# Patient Record
Sex: FEMALE | Race: WHITE | NOT HISPANIC OR LATINO | Employment: UNEMPLOYED | URBAN - METROPOLITAN AREA
[De-identification: names, ages, dates, MRNs, and addresses within clinical notes are randomized per-mention and may not be internally consistent; named-entity substitution may affect disease eponyms.]

---

## 2018-02-06 PROBLEM — F90.9 ATTENTION DEFICIT HYPERACTIVITY DISORDER (ADHD): Status: ACTIVE | Noted: 2018-02-06

## 2018-02-06 PROBLEM — F41.1 GAD (GENERALIZED ANXIETY DISORDER): Status: ACTIVE | Noted: 2018-02-06

## 2018-02-06 PROBLEM — Z30.9 ENCOUNTER FOR CONTRACEPTIVE MANAGEMENT: Status: ACTIVE | Noted: 2018-02-06

## 2018-03-07 PROBLEM — J04.0 LARYNGITIS: Status: ACTIVE | Noted: 2018-03-07

## 2018-03-07 PROBLEM — K21.9 GASTROESOPHAGEAL REFLUX DISEASE: Status: ACTIVE | Noted: 2018-03-07

## 2023-08-13 ENCOUNTER — HOSPITAL ENCOUNTER (OUTPATIENT)
Facility: HOSPITAL | Age: 33
Discharge: HOME | End: 2023-08-16
Attending: EMERGENCY MEDICINE | Admitting: INTERNAL MEDICINE
Payer: MEDICAID

## 2023-08-13 DIAGNOSIS — R07.9 CHEST PAIN: ICD-10-CM

## 2023-08-13 DIAGNOSIS — K80.00 CALCULUS OF GALLBLADDER WITH ACUTE CHOLECYSTITIS WITHOUT OBSTRUCTION: ICD-10-CM

## 2023-08-13 DIAGNOSIS — Z90.49 S/P LAPAROSCOPIC CHOLECYSTECTOMY: ICD-10-CM

## 2023-08-13 DIAGNOSIS — R10.11 RUQ ABDOMINAL PAIN: Primary | ICD-10-CM

## 2023-08-13 DIAGNOSIS — K81.0 ACUTE CHOLECYSTITIS: ICD-10-CM

## 2023-08-13 LAB
ALBUMIN SERPL BCP-MCNC: 4.2 G/DL (ref 3.5–5.2)
ALP SERPL-CCNC: 47 U/L (ref 55–135)
ALT SERPL W/O P-5'-P-CCNC: 20 U/L (ref 10–44)
ANION GAP SERPL CALC-SCNC: 8 MMOL/L (ref 8–16)
AST SERPL-CCNC: 16 U/L (ref 10–40)
B-HCG UR QL: NEGATIVE
BACTERIA #/AREA URNS HPF: ABNORMAL /HPF
BASOPHILS # BLD AUTO: 0.03 K/UL (ref 0–0.2)
BASOPHILS NFR BLD: 0.3 % (ref 0–1.9)
BILIRUB SERPL-MCNC: 0.6 MG/DL (ref 0.1–1)
BILIRUB UR QL STRIP: NEGATIVE
BUN SERPL-MCNC: 13 MG/DL (ref 6–20)
CALCIUM SERPL-MCNC: 8.9 MG/DL (ref 8.7–10.5)
CHLORIDE SERPL-SCNC: 107 MMOL/L (ref 95–110)
CLARITY UR: ABNORMAL
CO2 SERPL-SCNC: 25 MMOL/L (ref 23–29)
COLOR UR: YELLOW
CREAT SERPL-MCNC: 0.8 MG/DL (ref 0.5–1.4)
CTP QC/QA: YES
DIFFERENTIAL METHOD: NORMAL
EOSINOPHIL # BLD AUTO: 0.2 K/UL (ref 0–0.5)
EOSINOPHIL NFR BLD: 2.6 % (ref 0–8)
ERYTHROCYTE [DISTWIDTH] IN BLOOD BY AUTOMATED COUNT: 12.5 % (ref 11.5–14.5)
EST. GFR  (NO RACE VARIABLE): >60 ML/MIN/1.73 M^2
GLUCOSE SERPL-MCNC: 111 MG/DL (ref 70–110)
GLUCOSE UR QL STRIP: NEGATIVE
HCT VFR BLD AUTO: 38.9 % (ref 37–48.5)
HGB BLD-MCNC: 13.4 G/DL (ref 12–16)
HGB UR QL STRIP: ABNORMAL
HYALINE CASTS #/AREA URNS LPF: 31 /LPF
IMM GRANULOCYTES # BLD AUTO: 0.02 K/UL (ref 0–0.04)
IMM GRANULOCYTES NFR BLD AUTO: 0.2 % (ref 0–0.5)
KETONES UR QL STRIP: NEGATIVE
LEUKOCYTE ESTERASE UR QL STRIP: ABNORMAL
LIPASE SERPL-CCNC: 38 U/L (ref 4–60)
LYMPHOCYTES # BLD AUTO: 2.2 K/UL (ref 1–4.8)
LYMPHOCYTES NFR BLD: 23.8 % (ref 18–48)
MCH RBC QN AUTO: 29.8 PG (ref 27–31)
MCHC RBC AUTO-ENTMCNC: 34.4 G/DL (ref 32–36)
MCV RBC AUTO: 87 FL (ref 82–98)
MICROSCOPIC COMMENT: ABNORMAL
MONOCYTES # BLD AUTO: 0.6 K/UL (ref 0.3–1)
MONOCYTES NFR BLD: 6.4 % (ref 4–15)
NEUTROPHILS # BLD AUTO: 6.1 K/UL (ref 1.8–7.7)
NEUTROPHILS NFR BLD: 66.7 % (ref 38–73)
NITRITE UR QL STRIP: NEGATIVE
NRBC BLD-RTO: 0 /100 WBC
PH UR STRIP: 7 [PH] (ref 5–8)
PLATELET # BLD AUTO: 240 K/UL (ref 150–450)
PMV BLD AUTO: 11.4 FL (ref 9.2–12.9)
POTASSIUM SERPL-SCNC: 3.7 MMOL/L (ref 3.5–5.1)
PROT SERPL-MCNC: 7.4 G/DL (ref 6–8.4)
PROT UR QL STRIP: ABNORMAL
RBC # BLD AUTO: 4.49 M/UL (ref 4–5.4)
RBC #/AREA URNS HPF: 29 /HPF (ref 0–4)
SODIUM SERPL-SCNC: 140 MMOL/L (ref 136–145)
SP GR UR STRIP: 1.03 (ref 1–1.03)
SQUAMOUS #/AREA URNS HPF: 13 /HPF
URN SPEC COLLECT METH UR: ABNORMAL
UROBILINOGEN UR STRIP-ACNC: ABNORMAL EU/DL
WBC # BLD AUTO: 9.2 K/UL (ref 3.9–12.7)
WBC #/AREA URNS HPF: 36 /HPF (ref 0–5)

## 2023-08-13 PROCEDURE — G0378 HOSPITAL OBSERVATION PER HR: HCPCS

## 2023-08-13 PROCEDURE — 63600175 PHARM REV CODE 636 W HCPCS: Performed by: EMERGENCY MEDICINE

## 2023-08-13 PROCEDURE — 25000003 PHARM REV CODE 250: Performed by: EMERGENCY MEDICINE

## 2023-08-13 PROCEDURE — 80053 COMPREHEN METABOLIC PANEL: CPT | Performed by: EMERGENCY MEDICINE

## 2023-08-13 PROCEDURE — 96365 THER/PROPH/DIAG IV INF INIT: CPT

## 2023-08-13 PROCEDURE — 96375 TX/PRO/DX INJ NEW DRUG ADDON: CPT

## 2023-08-13 PROCEDURE — 96366 THER/PROPH/DIAG IV INF ADDON: CPT

## 2023-08-13 PROCEDURE — 81001 URINALYSIS AUTO W/SCOPE: CPT | Performed by: EMERGENCY MEDICINE

## 2023-08-13 PROCEDURE — 87086 URINE CULTURE/COLONY COUNT: CPT | Performed by: EMERGENCY MEDICINE

## 2023-08-13 PROCEDURE — 81025 URINE PREGNANCY TEST: CPT | Performed by: EMERGENCY MEDICINE

## 2023-08-13 PROCEDURE — 85025 COMPLETE CBC W/AUTO DIFF WBC: CPT | Performed by: EMERGENCY MEDICINE

## 2023-08-13 PROCEDURE — 96361 HYDRATE IV INFUSION ADD-ON: CPT

## 2023-08-13 PROCEDURE — 83690 ASSAY OF LIPASE: CPT | Performed by: EMERGENCY MEDICINE

## 2023-08-13 PROCEDURE — 99285 EMERGENCY DEPT VISIT HI MDM: CPT | Mod: 25

## 2023-08-13 RX ORDER — IBUPROFEN 200 MG
24 TABLET ORAL
Status: DISCONTINUED | OUTPATIENT
Start: 2023-08-13 | End: 2023-08-16 | Stop reason: HOSPADM

## 2023-08-13 RX ORDER — IBUPROFEN 200 MG
16 TABLET ORAL
Status: DISCONTINUED | OUTPATIENT
Start: 2023-08-13 | End: 2023-08-16 | Stop reason: HOSPADM

## 2023-08-13 RX ORDER — NALOXONE HCL 0.4 MG/ML
0.02 VIAL (ML) INJECTION
Status: DISCONTINUED | OUTPATIENT
Start: 2023-08-13 | End: 2023-08-16 | Stop reason: HOSPADM

## 2023-08-13 RX ORDER — GLUCAGON 1 MG
1 KIT INJECTION
Status: DISCONTINUED | OUTPATIENT
Start: 2023-08-13 | End: 2023-08-16 | Stop reason: HOSPADM

## 2023-08-13 RX ORDER — MORPHINE SULFATE 2 MG/ML
2 INJECTION, SOLUTION INTRAMUSCULAR; INTRAVENOUS EVERY 6 HOURS PRN
Status: DISCONTINUED | OUTPATIENT
Start: 2023-08-13 | End: 2023-08-16 | Stop reason: HOSPADM

## 2023-08-13 RX ORDER — SODIUM CHLORIDE 0.9 % (FLUSH) 0.9 %
10 SYRINGE (ML) INJECTION EVERY 12 HOURS PRN
Status: DISCONTINUED | OUTPATIENT
Start: 2023-08-13 | End: 2023-08-16 | Stop reason: HOSPADM

## 2023-08-13 RX ORDER — MORPHINE SULFATE 4 MG/ML
4 INJECTION, SOLUTION INTRAMUSCULAR; INTRAVENOUS
Status: COMPLETED | OUTPATIENT
Start: 2023-08-13 | End: 2023-08-13

## 2023-08-13 RX ORDER — ONDANSETRON 2 MG/ML
4 INJECTION INTRAMUSCULAR; INTRAVENOUS
Status: COMPLETED | OUTPATIENT
Start: 2023-08-13 | End: 2023-08-13

## 2023-08-13 RX ADMIN — SODIUM CHLORIDE 1000 ML: 0.9 INJECTION, SOLUTION INTRAVENOUS at 08:08

## 2023-08-13 RX ADMIN — MORPHINE SULFATE 4 MG: 4 INJECTION, SOLUTION INTRAMUSCULAR; INTRAVENOUS at 08:08

## 2023-08-13 RX ADMIN — ONDANSETRON 4 MG: 2 INJECTION INTRAMUSCULAR; INTRAVENOUS at 08:08

## 2023-08-13 RX ADMIN — PIPERACILLIN AND TAZOBACTAM 3.38 G: 3; .375 INJECTION, POWDER, LYOPHILIZED, FOR SOLUTION INTRAVENOUS; PARENTERAL at 10:08

## 2023-08-14 ENCOUNTER — ANESTHESIA EVENT (OUTPATIENT)
Dept: SURGERY | Facility: HOSPITAL | Age: 33
End: 2023-08-14
Payer: MEDICAID

## 2023-08-14 ENCOUNTER — ANESTHESIA (OUTPATIENT)
Dept: SURGERY | Facility: HOSPITAL | Age: 33
End: 2023-08-14
Payer: MEDICAID

## 2023-08-14 PROBLEM — Z90.49 S/P LAPAROSCOPIC CHOLECYSTECTOMY: Status: ACTIVE | Noted: 2023-08-14

## 2023-08-14 PROBLEM — R10.11 RUQ ABDOMINAL PAIN: Status: ACTIVE | Noted: 2023-08-14

## 2023-08-14 PROBLEM — R82.90 ABNORMAL FINDING ON URINALYSIS: Status: ACTIVE | Noted: 2023-08-14

## 2023-08-14 PROBLEM — K81.0 ACUTE CHOLECYSTITIS: Status: ACTIVE | Noted: 2023-08-13

## 2023-08-14 LAB
ERYTHROCYTE [DISTWIDTH] IN BLOOD BY AUTOMATED COUNT: 12.4 % (ref 11.5–14.5)
HCT VFR BLD AUTO: 36.2 % (ref 37–48.5)
HGB BLD-MCNC: 12.5 G/DL (ref 12–16)
MCH RBC QN AUTO: 30 PG (ref 27–31)
MCHC RBC AUTO-ENTMCNC: 34.5 G/DL (ref 32–36)
MCV RBC AUTO: 87 FL (ref 82–98)
PLATELET # BLD AUTO: 230 K/UL (ref 150–450)
PMV BLD AUTO: 12 FL (ref 9.2–12.9)
RBC # BLD AUTO: 4.16 M/UL (ref 4–5.4)
WBC # BLD AUTO: 8.34 K/UL (ref 3.9–12.7)

## 2023-08-14 PROCEDURE — 36415 COLL VENOUS BLD VENIPUNCTURE: CPT | Performed by: INTERNAL MEDICINE

## 2023-08-14 PROCEDURE — 63600175 PHARM REV CODE 636 W HCPCS: Performed by: NURSE ANESTHETIST, CERTIFIED REGISTERED

## 2023-08-14 PROCEDURE — 25000003 PHARM REV CODE 250: Performed by: NURSE ANESTHETIST, CERTIFIED REGISTERED

## 2023-08-14 PROCEDURE — 71000033 HC RECOVERY, INTIAL HOUR: Performed by: SURGERY

## 2023-08-14 PROCEDURE — C9290 INJ, BUPIVACAINE LIPOSOME: HCPCS | Performed by: SURGERY

## 2023-08-14 PROCEDURE — 47562 PR LAP,CHOLECYSTECTOMY: ICD-10-PCS | Mod: ,,, | Performed by: SURGERY

## 2023-08-14 PROCEDURE — 27201423 OPTIME MED/SURG SUP & DEVICES STERILE SUPPLY: Performed by: SURGERY

## 2023-08-14 PROCEDURE — 47562 LAPAROSCOPIC CHOLECYSTECTOMY: CPT | Mod: ,,, | Performed by: SURGERY

## 2023-08-14 PROCEDURE — 37000009 HC ANESTHESIA EA ADD 15 MINS: Performed by: SURGERY

## 2023-08-14 PROCEDURE — 25000003 PHARM REV CODE 250: Performed by: SURGERY

## 2023-08-14 PROCEDURE — 27000080 OPTIME MED/SURG SUP & DEVICES GENERAL CLASSIFICATION: Performed by: SURGERY

## 2023-08-14 PROCEDURE — 96367 TX/PROPH/DG ADDL SEQ IV INF: CPT | Mod: 59

## 2023-08-14 PROCEDURE — 63600175 PHARM REV CODE 636 W HCPCS: Performed by: INTERNAL MEDICINE

## 2023-08-14 PROCEDURE — 25000003 PHARM REV CODE 250: Performed by: INTERNAL MEDICINE

## 2023-08-14 PROCEDURE — 36000708 HC OR TIME LEV III 1ST 15 MIN: Performed by: SURGERY

## 2023-08-14 PROCEDURE — 63600175 PHARM REV CODE 636 W HCPCS: Performed by: SURGERY

## 2023-08-14 PROCEDURE — D9220A PRA ANESTHESIA: Mod: CRNA,,, | Performed by: NURSE ANESTHETIST, CERTIFIED REGISTERED

## 2023-08-14 PROCEDURE — 25000003 PHARM REV CODE 250: Performed by: ANESTHESIOLOGY

## 2023-08-14 PROCEDURE — 99222 PR INITIAL HOSPITAL CARE,LEVL II: ICD-10-PCS | Mod: 57,,, | Performed by: SURGERY

## 2023-08-14 PROCEDURE — 96366 THER/PROPH/DIAG IV INF ADDON: CPT | Mod: 59

## 2023-08-14 PROCEDURE — 63600175 PHARM REV CODE 636 W HCPCS: Performed by: ANESTHESIOLOGY

## 2023-08-14 PROCEDURE — D9220A PRA ANESTHESIA: ICD-10-PCS | Mod: CRNA,,, | Performed by: NURSE ANESTHETIST, CERTIFIED REGISTERED

## 2023-08-14 PROCEDURE — 37000008 HC ANESTHESIA 1ST 15 MINUTES: Performed by: SURGERY

## 2023-08-14 PROCEDURE — G0378 HOSPITAL OBSERVATION PER HR: HCPCS

## 2023-08-14 PROCEDURE — 36000709 HC OR TIME LEV III EA ADD 15 MIN: Performed by: SURGERY

## 2023-08-14 PROCEDURE — D9220A PRA ANESTHESIA: ICD-10-PCS | Mod: ANES,,, | Performed by: ANESTHESIOLOGY

## 2023-08-14 PROCEDURE — 71000039 HC RECOVERY, EACH ADD'L HOUR: Performed by: SURGERY

## 2023-08-14 PROCEDURE — 96376 TX/PRO/DX INJ SAME DRUG ADON: CPT | Mod: 59

## 2023-08-14 PROCEDURE — 85027 COMPLETE CBC AUTOMATED: CPT | Performed by: INTERNAL MEDICINE

## 2023-08-14 PROCEDURE — 99222 1ST HOSP IP/OBS MODERATE 55: CPT | Mod: 57,,, | Performed by: SURGERY

## 2023-08-14 PROCEDURE — D9220A PRA ANESTHESIA: Mod: ANES,,, | Performed by: ANESTHESIOLOGY

## 2023-08-14 RX ORDER — DIPHENHYDRAMINE HYDROCHLORIDE 50 MG/ML
INJECTION INTRAMUSCULAR; INTRAVENOUS
Status: DISCONTINUED | OUTPATIENT
Start: 2023-08-14 | End: 2023-08-14

## 2023-08-14 RX ORDER — ACETAMINOPHEN 325 MG/1
650 TABLET ORAL EVERY 6 HOURS PRN
Status: DISCONTINUED | OUTPATIENT
Start: 2023-08-14 | End: 2023-08-16 | Stop reason: HOSPADM

## 2023-08-14 RX ORDER — ACETAMINOPHEN 10 MG/ML
INJECTION, SOLUTION INTRAVENOUS
Status: DISCONTINUED | OUTPATIENT
Start: 2023-08-14 | End: 2023-08-14

## 2023-08-14 RX ORDER — FENTANYL CITRATE 50 UG/ML
INJECTION, SOLUTION INTRAMUSCULAR; INTRAVENOUS
Status: DISCONTINUED | OUTPATIENT
Start: 2023-08-14 | End: 2023-08-14

## 2023-08-14 RX ORDER — DEXTROAMPHETAMINE SACCHARATE, AMPHETAMINE ASPARTATE, DEXTROAMPHETAMINE SULFATE AND AMPHETAMINE SULFATE 5; 5; 5; 5 MG/1; MG/1; MG/1; MG/1
2 TABLET ORAL DAILY
COMMUNITY

## 2023-08-14 RX ORDER — DIPHENHYDRAMINE HYDROCHLORIDE 50 MG/ML
12.5 INJECTION INTRAMUSCULAR; INTRAVENOUS
Status: DISCONTINUED | OUTPATIENT
Start: 2023-08-14 | End: 2023-08-14 | Stop reason: HOSPADM

## 2023-08-14 RX ORDER — ROCURONIUM BROMIDE 10 MG/ML
INJECTION, SOLUTION INTRAVENOUS
Status: DISCONTINUED | OUTPATIENT
Start: 2023-08-14 | End: 2023-08-14

## 2023-08-14 RX ORDER — LIDOCAINE HYDROCHLORIDE 20 MG/ML
INJECTION INTRAVENOUS
Status: DISCONTINUED | OUTPATIENT
Start: 2023-08-14 | End: 2023-08-14

## 2023-08-14 RX ORDER — DEXAMETHASONE SODIUM PHOSPHATE 4 MG/ML
INJECTION, SOLUTION INTRA-ARTICULAR; INTRALESIONAL; INTRAMUSCULAR; INTRAVENOUS; SOFT TISSUE
Status: DISCONTINUED | OUTPATIENT
Start: 2023-08-14 | End: 2023-08-14

## 2023-08-14 RX ORDER — OXYCODONE HYDROCHLORIDE 5 MG/1
5 TABLET ORAL
Status: DISCONTINUED | OUTPATIENT
Start: 2023-08-14 | End: 2023-08-14 | Stop reason: HOSPADM

## 2023-08-14 RX ORDER — ONDANSETRON 2 MG/ML
INJECTION INTRAMUSCULAR; INTRAVENOUS
Status: DISCONTINUED | OUTPATIENT
Start: 2023-08-14 | End: 2023-08-14

## 2023-08-14 RX ORDER — MIDAZOLAM HYDROCHLORIDE 5 MG/ML
INJECTION INTRAMUSCULAR; INTRAVENOUS
Status: DISCONTINUED | OUTPATIENT
Start: 2023-08-14 | End: 2023-08-14

## 2023-08-14 RX ORDER — HYDROCODONE BITARTRATE AND ACETAMINOPHEN 5; 325 MG/1; MG/1
1 TABLET ORAL EVERY 6 HOURS PRN
Status: DISCONTINUED | OUTPATIENT
Start: 2023-08-14 | End: 2023-08-16 | Stop reason: HOSPADM

## 2023-08-14 RX ORDER — ONDANSETRON 2 MG/ML
4 INJECTION INTRAMUSCULAR; INTRAVENOUS EVERY 4 HOURS PRN
Status: DISCONTINUED | OUTPATIENT
Start: 2023-08-14 | End: 2023-08-16 | Stop reason: HOSPADM

## 2023-08-14 RX ORDER — HYDROMORPHONE HYDROCHLORIDE 1 MG/ML
0.2 INJECTION, SOLUTION INTRAMUSCULAR; INTRAVENOUS; SUBCUTANEOUS EVERY 5 MIN PRN
Status: DISCONTINUED | OUTPATIENT
Start: 2023-08-14 | End: 2023-08-14 | Stop reason: HOSPADM

## 2023-08-14 RX ORDER — ONDANSETRON 2 MG/ML
4 INJECTION INTRAMUSCULAR; INTRAVENOUS DAILY PRN
Status: DISCONTINUED | OUTPATIENT
Start: 2023-08-14 | End: 2023-08-14 | Stop reason: HOSPADM

## 2023-08-14 RX ORDER — SUCCINYLCHOLINE CHLORIDE 20 MG/ML
INJECTION INTRAMUSCULAR; INTRAVENOUS
Status: DISCONTINUED | OUTPATIENT
Start: 2023-08-14 | End: 2023-08-14

## 2023-08-14 RX ORDER — BUPIVACAINE HYDROCHLORIDE AND EPINEPHRINE 2.5; 5 MG/ML; UG/ML
INJECTION, SOLUTION EPIDURAL; INFILTRATION; INTRACAUDAL; PERINEURAL
Status: DISCONTINUED | OUTPATIENT
Start: 2023-08-14 | End: 2023-08-14 | Stop reason: HOSPADM

## 2023-08-14 RX ORDER — PROPOFOL 10 MG/ML
VIAL (ML) INTRAVENOUS
Status: DISCONTINUED | OUTPATIENT
Start: 2023-08-14 | End: 2023-08-14

## 2023-08-14 RX ORDER — SODIUM CHLORIDE 9 MG/ML
INJECTION, SOLUTION INTRAVENOUS CONTINUOUS
Status: DISCONTINUED | OUTPATIENT
Start: 2023-08-14 | End: 2023-08-15

## 2023-08-14 RX ADMIN — CEFTRIAXONE SODIUM 1 G: 1 INJECTION, POWDER, FOR SOLUTION INTRAMUSCULAR; INTRAVENOUS at 01:08

## 2023-08-14 RX ADMIN — ROCURONIUM BROMIDE 5 MG: 10 INJECTION, SOLUTION INTRAVENOUS at 12:08

## 2023-08-14 RX ADMIN — ACETAMINOPHEN 1000 MG: 10 INJECTION, SOLUTION INTRAVENOUS at 12:08

## 2023-08-14 RX ADMIN — HYDROMORPHONE HYDROCHLORIDE 0.2 MG: 1 INJECTION, SOLUTION INTRAMUSCULAR; INTRAVENOUS; SUBCUTANEOUS at 02:08

## 2023-08-14 RX ADMIN — MIDAZOLAM HYDROCHLORIDE 2 MG: 5 INJECTION, SOLUTION INTRAMUSCULAR; INTRAVENOUS at 12:08

## 2023-08-14 RX ADMIN — DEXAMETHASONE SODIUM PHOSPHATE 8 MG: 4 INJECTION, SOLUTION INTRAMUSCULAR; INTRAVENOUS at 12:08

## 2023-08-14 RX ADMIN — SODIUM CHLORIDE: 0.9 INJECTION, SOLUTION INTRAVENOUS at 08:08

## 2023-08-14 RX ADMIN — SODIUM CHLORIDE, SODIUM LACTATE, POTASSIUM CHLORIDE, AND CALCIUM CHLORIDE: .6; .31; .03; .02 INJECTION, SOLUTION INTRAVENOUS at 12:08

## 2023-08-14 RX ADMIN — PROPOFOL 180 MG: 10 INJECTION, EMULSION INTRAVENOUS at 12:08

## 2023-08-14 RX ADMIN — ONDANSETRON 4 MG: 2 INJECTION INTRAMUSCULAR; INTRAVENOUS at 12:08

## 2023-08-14 RX ADMIN — MORPHINE SULFATE 2 MG: 2 INJECTION, SOLUTION INTRAMUSCULAR; INTRAVENOUS at 08:08

## 2023-08-14 RX ADMIN — FENTANYL CITRATE 100 MCG: 50 INJECTION, SOLUTION INTRAMUSCULAR; INTRAVENOUS at 12:08

## 2023-08-14 RX ADMIN — ONDANSETRON 4 MG: 2 INJECTION INTRAMUSCULAR; INTRAVENOUS at 08:08

## 2023-08-14 RX ADMIN — LIDOCAINE HYDROCHLORIDE 60 MG: 20 INJECTION, SOLUTION INTRAVENOUS at 12:08

## 2023-08-14 RX ADMIN — HYDROMORPHONE HYDROCHLORIDE 0.2 MG: 1 INJECTION, SOLUTION INTRAMUSCULAR; INTRAVENOUS; SUBCUTANEOUS at 01:08

## 2023-08-14 RX ADMIN — Medication 160 MG: at 12:08

## 2023-08-14 RX ADMIN — OXYCODONE HYDROCHLORIDE 5 MG: 5 TABLET ORAL at 01:08

## 2023-08-14 RX ADMIN — DIPHENHYDRAMINE HYDROCHLORIDE 6.25 MG: 50 INJECTION INTRAMUSCULAR; INTRAVENOUS at 12:08

## 2023-08-14 RX ADMIN — SUGAMMADEX 200 MG: 100 INJECTION, SOLUTION INTRAVENOUS at 01:08

## 2023-08-14 NOTE — CONSULTS
GENERAL SURGERY  INPATIENT CONSULT    REASON FOR CONSULT: Cholecystitis    HPI: Kristina Blum is a 33 y.o. female admitted for suspicion acute cholecystitis. Patient presented with right upper abdominal pain radiating to the belly button which was worse after eating. Was associated with nausea. No fevers or chills. Had an ultrasound showing stones but no obvious gallbladder wall thickening or pericholecystic fluid.  She did have a positive sonographic Sandoval sign. CBD was in normal limits.  Labs were unremarkable.  Patient was admitted for clinical cholecystitis.  General surgery has been consulted for evaluation. Patient denies yellowing of the skin or eyes.  Pain somewhat better at current.    I have reviewed the patient's chart including prior progress notes, procedures and testing.     ROS:   Review of Systems    PROBLEM LIST:  Patient Active Problem List   Diagnosis    Encounter for contraceptive management    Attention deficit hyperactivity disorder (ADHD)    SOLIS (generalized anxiety disorder)    Gastroesophageal reflux disease    BMI 32.0-32.9,adult    Laryngitis    Cholelithiasis with acute cholecystitis         HISTORY  Past Medical History:   Diagnosis Date    Allergy     Arthritis     Asthma        Past Surgical History:   Procedure Laterality Date    FRACTURE SURGERY         Social History     Tobacco Use    Smoking status: Never    Smokeless tobacco: Never   Substance Use Topics    Alcohol use: No    Drug use: No       Family History   Problem Relation Age of Onset    Hypertension Mother     Diabetes Mother     No Known Problems Father     No Known Problems Sister     Cancer Maternal Grandmother     Heart disease Maternal Grandfather          MEDS:  No current facility-administered medications on file prior to encounter.     Current Outpatient Medications on File Prior to Encounter   Medication Sig Dispense Refill    dextroamphetamine-amphetamine (ADDERALL) 20 mg tablet Take 2 tablets by mouth once  daily.      drospirenone-ethinyl estradiol (KATE) 3-0.02 mg per tablet TAKE 1 TABLET BY MOUTH EVERY DAY (Patient taking differently: Take 1 tablet by mouth once daily.) 28 tablet 0    [DISCONTINUED] PRENATAL VITS-IRON FUM-FOLIC ORAL Take by mouth.         ALLERGIES:  Review of patient's allergies indicates:  No Known Allergies      VITALS:  Temp:  [97.9 °F (36.6 °C)-98.2 °F (36.8 °C)] 97.9 °F (36.6 °C)  Pulse:  [69-82] 77  Resp:  [16-18] 16  SpO2:  [97 %-100 %] 98 %  BP: (133-169)/(80-99) 149/83    I/O last 3 completed shifts:  In: 1000 [IV Piggyback:1000]  Out: -       PHYSICAL EXAM:  Physical Exam  Vitals reviewed.   Constitutional:       General: She is not in acute distress.     Appearance: Normal appearance. She is well-developed.   HENT:      Head: Normocephalic and atraumatic.      Nose: Nose normal.   Eyes:      General: No scleral icterus.     Conjunctiva/sclera: Conjunctivae normal.   Neck:      Trachea: No tracheal tenderness or tracheal deviation.   Cardiovascular:      Rate and Rhythm: Normal rate and regular rhythm.      Pulses: Normal pulses.   Pulmonary:      Effort: Pulmonary effort is normal. No accessory muscle usage or respiratory distress.      Breath sounds: Normal breath sounds.   Abdominal:      General: There is no distension.      Palpations: Abdomen is soft.      Tenderness: There is abdominal tenderness (Mild right upper quadrant). There is no guarding or rebound.   Musculoskeletal:         General: No swelling or tenderness. Normal range of motion.      Cervical back: Normal range of motion and neck supple. No rigidity.   Skin:     General: Skin is warm and dry.      Coloration: Skin is not jaundiced.      Findings: No erythema.   Neurological:      General: No focal deficit present.      Mental Status: She is alert and oriented to person, place, and time.      Motor: No weakness or abnormal muscle tone.   Psychiatric:         Mood and Affect: Mood normal.         Behavior: Behavior  "normal.         Thought Content: Thought content normal.         Judgment: Judgment normal.           LABS:  Lab Results   Component Value Date    WBC 8.34 08/14/2023    RBC 4.16 08/14/2023    HGB 12.5 08/14/2023    HCT 36.2 (L) 08/14/2023     08/14/2023     Lab Results   Component Value Date     (H) 08/13/2023     08/13/2023    K 3.7 08/13/2023     08/13/2023    CO2 25 08/13/2023    BUN 13 08/13/2023    CREATININE 0.8 08/13/2023    CALCIUM 8.9 08/13/2023     Lab Results   Component Value Date    ALT 20 08/13/2023    AST 16 08/13/2023    ALKPHOS 47 (L) 08/13/2023    BILITOT 0.6 08/13/2023     No results found for: "MG", "PHOS"    STUDIES:  Images and reports were personally reviewed.    Abdominal ultrasound  FINDINGS: Routine ultrasound evaluation of the right upper quadrant is performed. The IVC appears patent. The liver is mildly enlarged measuring up to 19 cm cephalocaudal with at least mild diffuse hepatic steatosis present. No solid or cystic hepatic lesions or peripheral nodularity is evident. The portal vein flow is hepatopedal.  The gallbladder contains multiple shadowing gallstones. No pathologic gallbladder wall thickening or pericholecystic fluid is seen. The common bile duct is normal caliber measuring 5 mm. The sonographic Sandoval's sign is positive. No ascites is evident. The pancreas is not included on the study. The right kidney measures 9.6 x 4.3 cm  and appears within normal limits.     IMPRESSION:  1. Cholelithiasis with a positive sonographic Sandoval sign. Surgical consultation is recommended to rule out acute cholecystitis.  2. Hepatomegaly and mild diffuse hepatic steatosis are incidentally noted.      ASSESSMENT & PLAN:  33 y.o. female with acute calculous cholecystitis  -history, imaging and exam consistent with acute calculous cholecystitis  -discussed with the patient options including conservative management versus surgical intervention, recommending surgical " intervention due to severity of symptoms which led her to the emergency room  - risks and benefits of cholecystectomy were reviewed.  Specifically we discussed the risk of pain, bleeding, scarring, infection, bile leak, injury to common bile duct, injury to surrounding organs, retained stone, pancreatitis, bile leak, failure to relieve symptoms, etc.  we also discussed the need for potential conversion to open procedure or need to perform intraoperative cholangiogram.  The patient expressed understanding of these risks and agreed proceed with surgical intervention.  -to OR today for laparoscopic cholecystectomy  -diet to be advanced as tolerated, could consider discharge home later this evening or tomorrow morning once tolerating diet and pain adequately controlled

## 2023-08-14 NOTE — TRANSFER OF CARE
"Anesthesia Transfer of Care Note    Patient: Kristina Blum    Procedure(s) Performed: Procedure(s) (LRB):  CHOLECYSTECTOMY, LAPAROSCOPIC (N/A)    Patient location: GI    Anesthesia Type: general    Transport from OR: Transported from OR on room air with adequate spontaneous ventilation    Post pain: adequate analgesia    Post assessment: no apparent anesthetic complications    Post vital signs: stable    Level of consciousness: awake and alert    Nausea/Vomiting: no nausea/vomiting    Complications: none    Transfer of care protocol was followed      Last vitals:   Visit Vitals  BP (!) 149/83 (BP Location: Left arm, Patient Position: Lying)   Pulse 77   Temp 36.6 °C (97.9 °F) (Oral)   Resp 16   Ht 5' 7" (1.702 m)   Wt 93.6 kg (206 lb 4.8 oz)   LMP 07/13/2023 (Approximate)   SpO2 98%   Breastfeeding No   BMI 32.31 kg/m²     "

## 2023-08-14 NOTE — PLAN OF CARE
[facility]  Initial Discharge Assessment       Primary Care Provider: Jeansonne, Richard M. Jr., MD    Admission Diagnosis: RUQ abdominal pain [R10.11]    Admission Date: 8/13/2023  Expected Discharge Date: 8/15/2023    Transition of Care Barriers: None    Initial assessment completed at bedside with pt. Pt anticipates discharge home with no needs when medically clear.    Payor: MEDICAID / Plan: [place] / Product Type: Managed Medicaid /     Extended Emergency Contact Information  Primary Emergency Contact: Kal Leroy  Mobile Phone: [phone]  Relation: Other  Preferred language: English   needed? No    Discharge Plan A: Home  Discharge Plan B: Home      [id number] - [place] - [address]  [address]  [place] [address]  Phone: [phone] Fax: [phone]      Initial Assessment (most recent)       Adult Discharge Assessment - 08/14/23 1611          Discharge Assessment    Assessment Type Discharge Planning Assessment     Confirmed/corrected address, phone number and insurance Yes     Confirmed Demographics Correct on Facesheet     Source of Information patient     When was your last doctors appointment? 05/08/23     Reason For Admission RUQ pain     People in Home alone     Facility Arrived From: home     Do you expect to return to your current living situation? Yes     Do you have help at home or someone to help you manage your care at home? Yes     Who are your caregiver(s) and their phone number(s)? Kal (other) [phone]     Prior to hospitilization cognitive status: Alert/Oriented     Current cognitive status: Alert/Oriented     Walking or Climbing Stairs --   none    Dressing/Bathing --   none    Home Layout Able to live on 1st floor     Equipment Currently Used at Home none     Readmission within 30 days? No     Patient currently being followed by outpatient case management?  No     Do you currently have service(s) that help you manage your care at home? No     Do you take prescription medications? Yes     Do you have prescription coverage? Yes     Coverage [employer]     Do you have any problems affording any of your prescribed medications? No     Is the patient taking medications as prescribed? yes     Who is going to help you get home at discharge? Kal (other) [phone]     How do you get to doctors appointments? car, drives self     Are you on dialysis? No     Do you take coumadin? No     Discharge Plan A Home     Discharge Plan B Home     DME Needed Upon Discharge  none     Discharge Plan discussed with: Patient     Transition of Care Barriers None        Physical Activity    On average, how many days per week do you engage in moderate to strenuous exercise (like a brisk walk)? 0 days     On average, how many minutes do you engage in exercise at this level? 0 min        Financial Resource Strain    How hard is it for you to pay for the very basics like food, housing, medical care, and heating? Not hard at all        Housing Stability    In the last 12 months, was there a time when you were not able to pay the mortgage or rent on time? No     In the last 12 months, how many places have you lived? 1     In the last 12 months, was there a time when you did not have a steady place to sleep or slept in a shelter (including now)? No        Transportation Needs    In the past 12 months, has lack of transportation kept you from medical appointments or from getting medications? No     In the past 12 months, has lack of transportation kept you from meetings, work, or from getting things needed for daily living? No        Food Insecurity    Within the past 12 months, you worried that your food would run out before you got the money to buy more. Never true     Within the past 12 months, the food you bought just didn't last and you didn't have money to get more. Never true         Stress    Do you feel stress - tense, restless, nervous, or anxious, or unable to sleep at night because your mind is troubled all the time - these days? Not at all        Social Connections    In a typical week, how many times do you talk on the phone with family, friends, or neighbors? Never     How often do you get together with friends or relatives? Never     How often do you attend Oriental orthodox or Sikh services? Never     Do you belong to any clubs or organizations such as Oriental orthodox groups, unions, fraternal or athletic groups, or school groups? No     How often do you attend meetings of the clubs or organizations you belong to? Never        Alcohol Use    Q1: How often do you have a drink containing alcohol? Monthly or less     Q2: How many drinks containing alcohol do you have on a typical day when you are drinking? 1 or 2     Q3: How often do you have six or more drinks on one occasion? Never

## 2023-08-14 NOTE — HOSPITAL COURSE
Patient presented with 4 hour history of abdominal pain.  Denies any urinary symptoms.  Afebrile, no leukocytosis, UA positive subsequent culture with multiple organisms and likely contaminant.  Ultrasound and CT with concern for acute calculous cholecystitis.  She was started on empiric antibiotics.  On 08/14 general surgery performed laparoscopic cholecystectomy.  She was monitored postoperatively, diet slowly advanced, serial abdominal examination, increase activity.  T bilirubin did increase to 1.2 but subsequently downtrended to 0.4.  On 08/16 tolerating oral diet, abdominal pain better control, passing flatus, she has ambulated around the unit.  Cleared by consultant for discharge and appears medically stable for discharge.  Laparoscopic cholecystectomy precautions/instructions reviewed with patient, educational material provided.  Discharge plan including medication, follow-up as well as return precautions were reviewed with the patient, she expressed understanding, no questions or concerns.  Discussed with nursing.      Discharge examination  Lying in bed, alert, oriented, on room air, abdomen soft, laparoscopic incision well approximated, bowel sounds heard

## 2023-08-14 NOTE — SUBJECTIVE & OBJECTIVE
Interval History:  See hospital course.  Seen in PACU after laparoscopic cholecystectomy.  Does report abdominal soreness.  States abdominal pain started about 4 hours prior to ED presentation.  Denies any urinary symptoms.  Afebrile, no leukocytosis, UA positive but cultures no growth to date.  Imaging reviewed.  Discussed with patient.    Review of Systems   Constitutional:  Negative for fever.   Respiratory:  Negative for shortness of breath.    Gastrointestinal:  Positive for abdominal pain.   Genitourinary:  Negative for dysuria.     Objective:     Vital Signs (Most Recent):  Temp: 97.9 °F (36.6 °C) (08/14/23 1415)  Pulse: 80 (08/14/23 1430)  Resp: 14 (08/14/23 1442)  BP: (!) 142/73 (08/14/23 1430)  SpO2: (!) 94 % (08/14/23 1430) Vital Signs (24h Range):  Temp:  [97.9 °F (36.6 °C)-98.2 °F (36.8 °C)] 97.9 °F (36.6 °C)  Pulse:  [69-88] 80  Resp:  [14-22] 14  SpO2:  [94 %-100 %] 94 %  BP: (133-169)/(73-99) 142/73     Weight: 93.6 kg (206 lb 4.8 oz)  Body mass index is 32.31 kg/m².    Intake/Output Summary (Last 24 hours) at 8/14/2023 1455  Last data filed at 8/14/2023 1305  Gross per 24 hour   Intake 2000 ml   Output --   Net 2000 ml         Physical Exam  Vitals and nursing note reviewed.   Constitutional:       General: She is not in acute distress.     Appearance: She is obese. She is not ill-appearing.   HENT:      Head: Normocephalic and atraumatic.      Mouth/Throat:      Mouth: Mucous membranes are moist.   Cardiovascular:      Rate and Rhythm: Normal rate and regular rhythm.   Pulmonary:      Comments: On NC  Abdominal:      Comments: Laparoscopic incision well approximated, abdominal striae present, appropriately tender to palpate   Skin:     General: Skin is warm.   Neurological:      Mental Status: She is alert and oriented to person, place, and time.   Psychiatric:         Mood and Affect: Mood normal.             Significant Labs: BMP:   Recent Labs   Lab 08/13/23 2032   *      K 3.7  "     CO2 25   BUN 13   CREATININE 0.8   CALCIUM 8.9     CBC:   Recent Labs   Lab 08/13/23 2032 08/14/23  0405   WBC 9.20 8.34   HGB 13.4 12.5   HCT 38.9 36.2*    230     CMP:   Recent Labs   Lab 08/13/23 2032      K 3.7      CO2 25   *   BUN 13   CREATININE 0.8   CALCIUM 8.9   PROT 7.4   ALBUMIN 4.2   BILITOT 0.6   ALKPHOS 47*   AST 16   ALT 20   ANIONGAP 8     Cardiac Markers: No results for input(s): "CKMB", "MYOGLOBIN", "BNP", "TROPISTAT" in the last 48 hours.  Lactic Acid: No results for input(s): "LACTATE" in the last 48 hours.  Magnesium: No results for input(s): "MG" in the last 48 hours.  POCT Glucose: No results for input(s): "POCTGLUCOSE" in the last 48 hours.    Significant Imaging: I have reviewed all pertinent imaging results/findings within the past 24 hours.    CT Abdomen Pelvis  Without Contrast    Result Date: 8/13/2023  PROCEDURE: CT ABDOMEN PELVIS WITHOUT IV CONTRAST, 8/13/2023 9:43 PM CDT CLINICAL INDICATION:  Flank pain, kidney stone suspected. COMPARISON: None TECHNIQUE: Helical CT acquisition performed of the abdomen and pelvis with coronal and sagittal reformats. PO Contrast: None.  Complications: None. CT Dose reduction techniques were utilized for this examination with 1 or more of the following: Automated exposure control and/or adjustment of the mA or kV according to patient size, and/or use of iterative reconstruction technique. FINDINGS: Evaluation of vessels and solid organs limited without IV contrast. Visualized chest base: Probable atelectasis in posterior lungs. Infiltrate or edema could also have a similar appearance. Free air: None. Hepatobiliary system: No contour deforming hepatic lesion. Gallbladder: Irregular appearance of the gallbladder with numerous gallstones and faint strandy changes adjacent portions of the gallbladder, appearance is most suggestive of acute cholecystitis. Spleen: Normal size. Pancreas: No significant peripancreatic " inflammatory changes. Adrenal glands: No focal mass. Kidneys and collecting system: No hydronephrosis. Bowel: No small bowel obstruction. Appendix: Normal caliber. No significant inflammatory changes. Pelvic organs: Unremarkable urinary bladder. Uterus present. Grossly symmetric ovaries. Bones: Prominent disc osteophyte complex at level of L3-4, advanced for patient age. This severely narrows the central spinal canal at this level. IMPRESSION: Irregular appearance of the gallbladder with numerous gallstones and faint strandy changes adjacent portions of the gallbladder, appearance is most suggestive of acute cholecystitis. Surgical consultation is advised. Probable atelectasis in posterior lungs. Infiltrate or edema could also have a similar appearance. Severe narrowing of central spinal canal at L3-4 due to prominent disc osteophyte complex, advanced disc complex and central spinal canal narrowing for patient age. Correlate for associated symptomatology. Dedicated MRI is advised for characterization when clinically appropriate. Results were called to ordering provider at 8:05 PM central time. Electronically signed by:  Patrick Stern MD  8/13/2023 10:05 PM CDT Workstation: [id number]    US Abdomen Limited    Result Date: 8/13/2023  EXAMINATION: US ABDOMEN LIMITED COMPARISON: None available. INDICATION: 33-year-old female with ruq abdominal pain FINDINGS: Routine ultrasound evaluation of the right upper quadrant is performed. The IVC appears patent. The liver is mildly enlarged measuring up to 19 cm cephalocaudal with at least mild diffuse hepatic steatosis present. No solid or cystic hepatic lesions or peripheral nodularity is evident. The portal vein flow is hepatopedal.  The gallbladder contains multiple shadowing gallstones. No pathologic gallbladder wall thickening or pericholecystic fluid is seen. The common bile duct is normal caliber measuring 5 mm. The sonographic Sandoval's sign is positive. No ascites is  evident. The pancreas is not included on the study. The right kidney measures 9.6 x 4.3 cm  and appears within normal limits. IMPRESSION: 1. Cholelithiasis with a positive sonographic Sandoval sign. Surgical consultation is recommended to rule out acute cholecystitis. 2. Hepatomegaly and mild diffuse hepatic steatosis are incidentally noted. Electronically signed by:  Neno Bobo MD  8/13/2023 8:28 PM CDT Workstation: [id number]

## 2023-08-14 NOTE — PROGRESS NOTES
[facility] Medicine  Progress Note    Patient Name: Kristina Blum  MRN: [id number]  Patient Class: OP- Observation   Admission Date: 8/13/2023  Length of Stay: 0 days  Attending Physician: Meghann Dodson MD  Primary Care Provider: Jocelin, Primary Doctor        Subjective:     Principal Problem:S/P laparoscopic cholecystectomy        HPI:  33-year-old female with no significant PMH came with complaining right upper right abdominal pain.   It  Pain began earlier today and constant and worsen with eating food but deny fever , chills or SOB but some nausea   US is positive but   She denies any dysuria or frequency.    US abdomen was done with Cholelithiasis with a positive sonographic Sandoval sign but no typical feature of cholecystitis and CT abdomen pending . CBD normal caliber .  On exam , no guarding and marked tenderness  in RHC most likely clinical cholecystitis . She is on OC  pill, overweight and female.      Overview/Hospital Course:  Patient presented with 4 hour history of abdominal pain.  Denies any urinary symptoms.  Afebrile, no leukocytosis, UA positive.  Ultrasound and CT with concern for acute calculous cholecystitis.  She was started on empiric antibiotics.  On 08/14 general surgery performed laparoscopic cholecystectomy.  Monitoring postoperatively.      Interval History:  See hospital course.  Seen in PACU after laparoscopic cholecystectomy.  Does report abdominal soreness.  States abdominal pain started about 4 hours prior to ED presentation.  Denies any urinary symptoms.  Afebrile, no leukocytosis, UA positive but cultures no growth to date.  Imaging reviewed.  Discussed with patient.    Review of Systems   Constitutional:  Negative for fever.   Respiratory:  Negative for shortness of breath.    Gastrointestinal:  Positive for abdominal pain.   Genitourinary:  Negative for dysuria.     Objective:     Vital Signs (Most Recent):  Temp: 97.9 °F (36.6 °C) (08/14/23 1415)  Pulse:  "80 (08/14/23 1430)  Resp: 14 (08/14/23 1442)  BP: (!) 142/73 (08/14/23 1430)  SpO2: (!) 94 % (08/14/23 1430) Vital Signs (24h Range):  Temp:  [97.9 °F (36.6 °C)-98.2 °F (36.8 °C)] 97.9 °F (36.6 °C)  Pulse:  [69-88] 80  Resp:  [14-22] 14  SpO2:  [94 %-100 %] 94 %  BP: (133-169)/(73-99) 142/73     Weight: 93.6 kg (206 lb 4.8 oz)  Body mass index is 32.31 kg/m².    Intake/Output Summary (Last 24 hours) at 8/14/2023 1455  Last data filed at 8/14/2023 1305  Gross per 24 hour   Intake 2000 ml   Output --   Net 2000 ml         Physical Exam  Vitals and nursing note reviewed.   Constitutional:       General: She is not in acute distress.     Appearance: She is obese. She is not ill-appearing.   HENT:      Head: Normocephalic and atraumatic.      Mouth/Throat:      Mouth: Mucous membranes are moist.   Cardiovascular:      Rate and Rhythm: Normal rate and regular rhythm.   Pulmonary:      Comments: On NC  Abdominal:      Comments: Laparoscopic incision well approximated, abdominal striae present, appropriately tender to palpate   Skin:     General: Skin is warm.   Neurological:      Mental Status: She is alert and oriented to person, place, and time.   Psychiatric:         Mood and Affect: Mood normal.             Significant Labs: BMP:   Recent Labs   Lab 08/13/23 2032   *      K 3.7      CO2 25   BUN 13   CREATININE 0.8   CALCIUM 8.9     CBC:   Recent Labs   Lab 08/13/23 2032 08/14/23  0405   WBC 9.20 8.34   HGB 13.4 12.5   HCT 38.9 36.2*    230     CMP:   Recent Labs   Lab 08/13/23 2032      K 3.7      CO2 25   *   BUN 13   CREATININE 0.8   CALCIUM 8.9   PROT 7.4   ALBUMIN 4.2   BILITOT 0.6   ALKPHOS 47*   AST 16   ALT 20   ANIONGAP 8     Cardiac Markers: No results for input(s): "CKMB", "MYOGLOBIN", "BNP", "TROPISTAT" in the last 48 hours.  Lactic Acid: No results for input(s): "LACTATE" in the last 48 hours.  Magnesium: No results for input(s): "MG" in the last 48 " "hours.  POCT Glucose: No results for input(s): "POCTGLUCOSE" in the last 48 hours.    Significant Imaging: I have reviewed all pertinent imaging results/findings within the past 24 hours.    CT Abdomen Pelvis  Without Contrast    Result Date: 8/13/2023  PROCEDURE: CT ABDOMEN PELVIS WITHOUT IV CONTRAST, 8/13/2023 9:43 PM CDT CLINICAL INDICATION:  Flank pain, kidney stone suspected. COMPARISON: None TECHNIQUE: Helical CT acquisition performed of the abdomen and pelvis with coronal and sagittal reformats. PO Contrast: None.  Complications: None. CT Dose reduction techniques were utilized for this examination with 1 or more of the following: Automated exposure control and/or adjustment of the mA or kV according to patient size, and/or use of iterative reconstruction technique. FINDINGS: Evaluation of vessels and solid organs limited without IV contrast. Visualized chest base: Probable atelectasis in posterior lungs. Infiltrate or edema could also have a similar appearance. Free air: None. Hepatobiliary system: No contour deforming hepatic lesion. Gallbladder: Irregular appearance of the gallbladder with numerous gallstones and faint strandy changes adjacent portions of the gallbladder, appearance is most suggestive of acute cholecystitis. Spleen: Normal size. Pancreas: No significant peripancreatic inflammatory changes. Adrenal glands: No focal mass. Kidneys and collecting system: No hydronephrosis. Bowel: No small bowel obstruction. Appendix: Normal caliber. No significant inflammatory changes. Pelvic organs: Unremarkable urinary bladder. Uterus present. Grossly symmetric ovaries. Bones: Prominent disc osteophyte complex at level of L3-4, advanced for patient age. This severely narrows the central spinal canal at this level. IMPRESSION: Irregular appearance of the gallbladder with numerous gallstones and faint strandy changes adjacent portions of the gallbladder, appearance is most suggestive of acute cholecystitis. " Surgical consultation is advised. Probable atelectasis in posterior lungs. Infiltrate or edema could also have a similar appearance. Severe narrowing of central spinal canal at L3-4 due to prominent disc osteophyte complex, advanced disc complex and central spinal canal narrowing for patient age. Correlate for associated symptomatology. Dedicated MRI is advised for characterization when clinically appropriate. Results were called to ordering provider at 8:05 PM central time. Electronically signed by:  Patrick Stern MD  8/13/2023 10:05 PM CDT Workstation: [id number]    US Abdomen Limited    Result Date: 8/13/2023  EXAMINATION: US ABDOMEN LIMITED COMPARISON: None available. INDICATION: 33-year-old female with ruq abdominal pain FINDINGS: Routine ultrasound evaluation of the right upper quadrant is performed. The IVC appears patent. The liver is mildly enlarged measuring up to 19 cm cephalocaudal with at least mild diffuse hepatic steatosis present. No solid or cystic hepatic lesions or peripheral nodularity is evident. The portal vein flow is hepatopedal.  The gallbladder contains multiple shadowing gallstones. No pathologic gallbladder wall thickening or pericholecystic fluid is seen. The common bile duct is normal caliber measuring 5 mm. The sonographic Sandoval's sign is positive. No ascites is evident. The pancreas is not included on the study. The right kidney measures 9.6 x 4.3 cm  and appears within normal limits. IMPRESSION: 1. Cholelithiasis with a positive sonographic Sandoval sign. Surgical consultation is recommended to rule out acute cholecystitis. 2. Hepatomegaly and mild diffuse hepatic steatosis are incidentally noted. Electronically signed by:  Neno Bobo MD  8/13/2023 8:28 PM CDT Workstation: [id number]         Assessment/Plan:      Active Hospital Problems    Diagnosis    *S/P laparoscopic cholecystectomy    Abnormal finding on urinalysis    Calculus of gallbladder with acute cholecystitis  without obstruction    BMI 32.0-32.9,adult    Attention deficit hyperactivity disorder (ADHD)     Plan:  Continue care on medical floor  Post laparoscopic cholecystectomy care as per surgery   Clear liquid diet advanced as tolerated   P.r.n. antiemetic analgesic as ordered  Follow up pending urine culture, no growth to date   Serial abdominal examination   A.m. labs ordered  Appreciate general surgery input  Further plan as per hospital course    VTE Risk Mitigation (From admission, onward)         Ordered     IP VTE HIGH RISK PATIENT  Once         08/13/23 2202     Place sequential compression device  Until discontinued         08/13/23 2202                Discharge Planning   ARTIS: 8/15/2023     Code Status: Full Code   Is the patient medically ready for discharge?:     Reason for patient still in hospital (select all that apply): Patient trending condition                     Meghann Dodson MD  Department of Hospital Medicine   [facility]

## 2023-08-14 NOTE — OP NOTE
DATE OF PROCEDURE: 08/14/2023    PREOPERATIVE DIAGNOSIS: Acute calculous cholecystitis    POSTOPERATIVE DIAGNOSIS: Same    PROCEDURE: Laparoscopic cholecystectomy    SURGEON: Alex Mckeon M.D    ASSISTANT: None    ANESTHESIA: General endotracheal    ESTIMATED BLOOD LOSS: Minimal    SPECIMEN: Gallbladder    CONDITION: Stable    COMPLICATIONS: None    FINDINGS:   1. Acutely inflamed gallbladder  2. Critical view of safety achieved  3. No spillage of stones or bile    INDICATIONS: The patient is a 33 y.o. female who presented the emergency room with severe right upper quadrant abdominal pain with associated nausea.  Imaging showed stones but no evidence of pericholecystic fluid or gallbladder wall thickening. She did however have a positive Sandoval sign was admitted for acute cholecystitis. The patient was counseled on their surgical options and desired surgical intervention. The risks of the procedure were described to the patient including pain, infection, bleeding, scarring, wound complications, injury to local structures such as bile duct, liver or intestine, warranting more extensive surgery, retained common bile duct stone or need for further intervention. The patient demonstrated understanding of these risks and a consent form was obtained.    PROCEDURE IN DETAIL: PROCEDURE IN DETAIL: The patient was identified in the Preoperative Unit and taken back to the Operating Room and laid supine on the operating room table. IV antibiotics were administered and general anesthesia was induced without complication. The patient was then prepped and draped in the standard sterile fashion. A timeout was performed in accordance with hospital protocol.  A Veress needle was introduced into the abdominal cavity and insufflation was attached. We had appropriate initial pressures and pneumoperitoneum was achieved. Local anesthetic was injected then a stab incision was sharply made just above the umbilicus. A 5 mm Optiview  trocar was introduced into the peritoneal cavity under direct visualization.  We examined the trocar and Veress needle insertion sites and no obvious injury was identified. An 11 mm trocar was then placed in subxiphoid region under direct visualization after injecting local anesthetic.  Two additional 5 mm trocars were placed in the right mid and right lateral abdomen under direct visualization again after injecting local anesthetic. The gallbladder was identified and found to acutely inflamed with a significant amount of edema specially along the posterior wall and around the infundibulum. Omental adhesions were taken down. The gallbladder fundus was grasped and retracted into the right upper quadrant and the infundibulum retracted laterally. The peritoneum over the infundibulum and cystic structures was then gently stripped until the cystic duct and artery were identified and the critical view of safety was achieved.The cystic duct and artery were doubly clipped proximally and once distally and then divided. The gallbladder was then dissected off the gallbladder fossa using electrocautery. Once dissection was completed, the gallbladder was placed into an EndoCatch bag and removed through the subxiphoid port. A small bleeding arterial branch was separately clipped. The dissection field was inspected for hemostasis, bile leak and to confirmed clips were in place. Additional local anesthetic was injected around the port sites under direct visualization.  The subxiphoid fascial incision was closed with a 0 Vicryl suture. The abdomen was desufflated and ports removed. Additional local anesthetic was injected and all the skin incisions were closed using a 4-0 Monocryl subcuticular stitch. Dermabond was then applied. The patient was awakened from general anesthesia without complication and returned to the Postoperative Recovery Unit in stable condition. At the end of the case, sponge, instrument and needle counts were  correct on 2 occasions. I was present and scrubbed throughout the entirety of the case.

## 2023-08-14 NOTE — H&P
Former[facility] - Emergency Dept  Hospital Medicine  History & Physical    Patient Name: Kristina Blum  MRN: [id number]  Patient Class: OP- Observation  Admission Date: 8/13/2023  Attending Physician: Andres Ramirez MD   Primary Care Provider: Jocelin Primary Doctor         Patient information was obtained from patient and ER records.     Subjective:     Principal Problem:Cholelithiasis with acute cholecystitis    Chief Complaint:   Chief Complaint   Patient presents with    Flank Pain     Pt c/o pain to the right side of her ribs then going into her back and stomach        HPI: 33-year-old female with no significant PMH came with complaining right upper right abdominal pain.   It  Pain began earlier today and constant and worsen with eating food but deny fever , chills or SOB but some nausea   US is positive but   She denies any dysuria or frequency.    US abdomen was done with Cholelithiasis with a positive sonographic Sandoval sign but no typical feature of cholecystitis and CT abdomen pending . CBD normal caliber .  On exam , no guarding and marked tenderness  in RHC most likely clinical cholecystitis . She is on OC  pill, overweight and female.      Past Medical History:   Diagnosis Date    Allergy     Arthritis     Asthma        Past Surgical History:   Procedure Laterality Date    FRACTURE SURGERY         Review of patient's allergies indicates:  No Known Allergies    No current facility-administered medications on file prior to encounter.     Current Outpatient Medications on File Prior to Encounter   Medication Sig    drospirenone-ethinyl estradiol (KATE) 3-0.02 mg per tablet TAKE 1 TABLET BY MOUTH EVERY DAY    PRENATAL VITS-IRON FUM-FOLIC ORAL Take by mouth.    [DISCONTINUED] amoxicillin-clavulanate 875-125mg (AUGMENTIN) 875-125 mg per tablet Take 1 tablet by mouth every 12 (twelve) hours.     Family History       Problem Relation (Age of Onset)    Cancer Maternal Grandmother    Diabetes Mother     Heart disease Maternal Grandfather    Hypertension Mother    No Known Problems Father, Sister          Tobacco Use    Smoking status: Never    Smokeless tobacco: Never   Substance and Sexual Activity    Alcohol use: No    Drug use: No    Sexual activity: Yes     Partners: Male     Review of Systems   Constitutional:  Negative for activity change and fever.   Respiratory:  Negative for shortness of breath and wheezing.    Cardiovascular:  Negative for chest pain and leg swelling.   Gastrointestinal:  Positive for abdominal pain. Negative for abdominal distention.   Genitourinary:  Negative for difficulty urinating.   Skin:  Negative for color change.   Neurological:  Negative for dizziness and facial asymmetry.   Psychiatric/Behavioral:  Negative for agitation and confusion.      Objective:     Vital Signs (Most Recent):  Temp: 98 °F (36.7 °C) (08/13/23 1929)  Pulse: 71 (08/13/23 2130)  Resp: 18 (08/13/23 2040)  BP: (!) 152/87 (08/13/23 2130)  SpO2: 99 % (08/13/23 2130) Vital Signs (24h Range):  Temp:  [98 °F (36.7 °C)] 98 °F (36.7 °C)  Pulse:  [71-82] 71  Resp:  [17-18] 18  SpO2:  [99 %-100 %] 99 %  BP: (152-169)/(80-99) 152/87     Weight: 90.7 kg (200 lb)  Body mass index is 31.32 kg/m².     Physical Exam  Vitals and nursing note reviewed.   HENT:      Head: Normocephalic and atraumatic.   Eyes:      Conjunctiva/sclera: Conjunctivae normal.   Neck:      Vascular: No JVD.   Cardiovascular:      Heart sounds: Normal heart sounds.   Pulmonary:      Effort: Pulmonary effort is normal.      Breath sounds: Normal breath sounds.   Abdominal:      Palpations: Abdomen is soft.      Tenderness: There is abdominal tenderness.   Musculoskeletal:         General: Normal range of motion.   Skin:     General: Skin is warm.   Neurological:      Mental Status: She is alert and oriented to person, place, and time.   Psychiatric:         Mood and Affect: Mood normal.                Significant Labs: All pertinent labs within the  "past 24 hours have been reviewed.  CBC:   Recent Labs   Lab 08/13/23 2032   WBC 9.20   HGB 13.4   HCT 38.9        CMP:   Recent Labs   Lab 08/13/23 2032      K 3.7      CO2 25   *   BUN 13   CREATININE 0.8   CALCIUM 8.9   PROT 7.4   ALBUMIN 4.2   BILITOT 0.6   ALKPHOS 47*   AST 16   ALT 20   ANIONGAP 8     Cardiac Markers: No results for input(s): "CKMB", "MYOGLOBIN", "BNP", "TROPISTAT" in the last 48 hours.    Significant Imaging: I have reviewed all pertinent imaging results/findings within the past 24 hours.    Assessment/Plan:     * Cholelithiasis with acute cholecystitis  Appear clinical cholecystitis     Plan   Npo   IVF   Rocephin   Got zosyn one dose in ER   Surgeon consulted , not back yet  Most likely lap sophia in am   Morphine PRN         BMI 32.0-32.9,adult  aware      SOLIS (generalized anxiety disorder)  aware        VTE Risk Mitigation (From admission, onward)         Ordered     IP VTE HIGH RISK PATIENT  Once         08/13/23 2202     Place sequential compression device  Until discontinued         08/13/23 2202                   On 08/13/2023, patient should be placed in hospital observation services under my care.        Andres Ramirez MD  Department of Hospital Medicine  Former[facility], [facility] - Emergency Dept  "

## 2023-08-14 NOTE — ANESTHESIA PREPROCEDURE EVALUATION
08/14/2023  Kristina Blum is a 33 y.o., female.         Tobacco Use:  The patient  reports that she has never smoked. She has never used smokeless tobacco.     No results found for this or any previous visit.     Imaging Results          CT Abdomen Pelvis  Without Contrast (Final result)  Result time 08/13/23 22:05:24    Final result by Patrick Stern MD (08/13/23 22:05:24)                 Narrative:    PROCEDURE: CT ABDOMEN PELVIS WITHOUT IV CONTRAST, 8/13/2023 9:43 PM CDT    CLINICAL INDICATION:  Flank pain, kidney stone suspected.    COMPARISON: None    TECHNIQUE: Helical CT acquisition performed of the abdomen and pelvis with coronal and sagittal reformats. PO Contrast: None.  Complications: None.    CT Dose reduction techniques were utilized for this examination with 1 or more of the following: Automated exposure control and/or adjustment of the mA or kV according to patient size, and/or use of iterative reconstruction technique.    FINDINGS:    Evaluation of vessels and solid organs limited without IV contrast.    Visualized chest base: Probable atelectasis in posterior lungs. Infiltrate or edema could also have a similar appearance.    Free air: None.    Hepatobiliary system: No contour deforming hepatic lesion.    Gallbladder: Irregular appearance of the gallbladder with numerous gallstones and faint strandy changes adjacent portions of the gallbladder, appearance is most suggestive of acute cholecystitis.    Spleen: Normal size.    Pancreas: No significant peripancreatic inflammatory changes.    Adrenal glands: No focal mass.    Kidneys and collecting system: No hydronephrosis.    Bowel: No small bowel obstruction.    Appendix: Normal caliber. No significant inflammatory changes.    Pelvic organs: Unremarkable urinary bladder. Uterus present. Grossly symmetric ovaries.    Bones: Prominent disc  osteophyte complex at level of L3-4, advanced for patient age. This severely narrows the central spinal canal at this level.    IMPRESSION:    Irregular appearance of the gallbladder with numerous gallstones and faint strandy changes adjacent portions of the gallbladder, appearance is most suggestive of acute cholecystitis. Surgical consultation is advised.    Probable atelectasis in posterior lungs. Infiltrate or edema could also have a similar appearance.    Severe narrowing of central spinal canal at L3-4 due to prominent disc osteophyte complex, advanced disc complex and central spinal canal narrowing for patient age. Correlate for associated symptomatology. Dedicated MRI is advised for characterization when clinically appropriate.      Results were called to ordering provider at 8:05 PM central time.    Electronically signed by:  Patrick Stern MD  8/13/2023 10:05 PM CDT Workstation: [id number]                             US Abdomen Limited (Final result)  Result time 08/13/23 20:28:42    Final result by Neno Bobo MD (08/13/23 20:28:42)                 Narrative:    EXAMINATION: US ABDOMEN LIMITED    COMPARISON: None available.    INDICATION: 33-year-old female with ruq abdominal pain    FINDINGS: Routine ultrasound evaluation of the right upper quadrant is performed. The IVC appears patent. The liver is mildly enlarged measuring up to 19 cm cephalocaudal with at least mild diffuse hepatic steatosis present. No solid or cystic hepatic lesions or peripheral nodularity is evident. The portal vein flow is hepatopedal.  The gallbladder contains multiple shadowing gallstones. No pathologic gallbladder wall thickening or pericholecystic fluid is seen. The common bile duct is normal caliber measuring 5 mm. The sonographic Sandoval's sign is positive. No ascites is evident. The pancreas is not included on the study. The right kidney measures 9.6 x 4.3 cm  and appears within normal limits.    IMPRESSION:  1.  Cholelithiasis with a positive sonographic Sandoval sign. Surgical consultation is recommended to rule out acute cholecystitis.  2. Hepatomegaly and mild diffuse hepatic steatosis are incidentally noted.    Electronically signed by:  Neno Bobo MD  8/13/2023 8:28 PM CDT Workstation: [id number]                               Lab Results   Component Value Date    WBC 8.34 08/14/2023    HGB 12.5 08/14/2023    HCT 36.2 (L) 08/14/2023    MCV 87 08/14/2023     08/14/2023     BMP  Lab Results   Component Value Date     08/13/2023    K 3.7 08/13/2023     08/13/2023    CO2 25 08/13/2023    BUN 13 08/13/2023    CREATININE 0.8 08/13/2023    CALCIUM 8.9 08/13/2023    ANIONGAP 8 08/13/2023     (H) 08/13/2023          Latest Reference Range & Units 08/13/23 20:30   Preg Test, Ur Negative  Negative         Pre-op Assessment    I have reviewed the Patient Summary Reports.     I have reviewed the Nursing Notes. I have reviewed the NPO Status.   I have reviewed the Medications.     Review of Systems  Anesthesia Hx:  No problems with previous Anesthesia  Denies Family Hx of Anesthesia complications.   Denies Personal Hx of Anesthesia complications.   Social:  No Alcohol Use, Non-Smoker    Hematology/Oncology:  Hematology Normal   Oncology Normal     EENT/Dental:EENT/Dental Normal   Cardiovascular:  Cardiovascular Normal     Pulmonary:   Asthma asymptomatic    Renal/:  Renal/ Normal     Hepatic/GI:   GERD, well controlled Cholelithiasis with acute cholecystitis   Musculoskeletal:   Arthritis   Spine Disorders: lumbar Disc disease and Chronic Pain    Neurological:  Neurology Normal    Endocrine:  Obesity / BMI > 30  Dermatological:  Skin Normal    Psych:   Psychiatric History anxiety  Attention Deficit Disorder.         Physical Exam  General: Well nourished, Cooperative, Alert and Oriented    Airway:  Mallampati: III / II  Mouth Opening: Normal  TM Distance: > 6 cm  Tongue: Normal  Neck ROM: Normal  ROM    Dental:  Intact    Chest/Lungs:  Clear to auscultation, Normal Respiratory Rate    Heart:  Rate: Normal  Rhythm: Regular Rhythm        Anesthesia Plan  Type of Anesthesia, risks & benefits discussed:    Anesthesia Type: Gen ETT  Intra-op Monitoring Plan: Standard ASA Monitors  Post Op Pain Control Plan: multimodal analgesia and IV/PO Opioids PRN  Induction:  IV  Airway Plan: Direct and Video, Post-Induction  Informed Consent: Informed consent signed with the Patient and all parties understand the risks and agree with anesthesia plan.  All questions answered.   ASA Score: 3  Anesthesia Plan Notes:       GETA    Benadryl 6.25 mg iv, Decadron 8 mg, iv Zofran 4 mg iv, Pepcid 20 mg iv     Ofirmev 1000 mg iv    Sugammadex     Ready For Surgery From Anesthesia Perspective.     .

## 2023-08-14 NOTE — ED PROVIDER NOTES
Encounter Date: 8/13/2023       History     Chief Complaint   Patient presents with    Flank Pain     Pt c/o pain to the right side of her ribs then going into her back and stomach     33-year-old female complaining right upper abdominal pain.  Pain began earlier today.  Pain is constant, waxing and waning, severe at times.  It radiates to her epigastrium and right flank.  She denies any alleviating or exacerbating factors.  She had some mild nausea earlier.  She denies any dysuria or frequency.  She denies similar episodes in the past.  She denies previous abdominal surgery.    The history is provided by the patient.     Review of patient's allergies indicates:  No Known Allergies  Past Medical History:   Diagnosis Date    Allergy     Arthritis     Asthma      Past Surgical History:   Procedure Laterality Date    FRACTURE SURGERY       Family History   Problem Relation Age of Onset    Hypertension Mother     Diabetes Mother     No Known Problems Father     No Known Problems Sister     Cancer Maternal Grandmother     Heart disease Maternal Grandfather      Social History     Tobacco Use    Smoking status: Never    Smokeless tobacco: Never   Substance Use Topics    Alcohol use: No    Drug use: No     Review of Systems   Gastrointestinal:  Positive for abdominal pain.   All other systems reviewed and are negative.      Physical Exam     Initial Vitals [08/13/23 1929]   BP Pulse Resp Temp SpO2   (!) 169/99 79 17 98 °F (36.7 °C) 100 %      MAP       --         Physical Exam    Nursing note and vitals reviewed.  Constitutional: She appears well-developed and well-nourished. She is not diaphoretic. No distress.   HENT:   Head: Normocephalic.   Eyes: Conjunctivae are normal.   Neck: Neck supple.   Normal range of motion.  Cardiovascular:  Normal rate.           Pulmonary/Chest: No respiratory distress.   Abdominal: She exhibits no distension. There is abdominal tenderness.   Focal right upper quadrant abdominal tenderness  to palpation   Musculoskeletal:         General: No edema.      Cervical back: Normal range of motion and neck supple.     Neurological: She is alert. She has normal strength. GCS eye subscore is 4. GCS verbal subscore is 5. GCS motor subscore is 6.   Skin: Skin is warm and dry.   Psychiatric: She has a normal mood and affect.         ED Course   Procedures  Labs Reviewed   COMPREHENSIVE METABOLIC PANEL - Abnormal; Notable for the following components:       Result Value    Glucose 111 (*)     Alkaline Phosphatase 47 (*)     All other components within normal limits   URINALYSIS, REFLEX TO URINE CULTURE - Abnormal; Notable for the following components:    Appearance, UA Hazy (*)     Protein, UA Trace (*)     Occult Blood UA 1+ (*)     Urobilinogen, UA 2.0-3.0 (*)     Leukocytes, UA 3+ (*)     All other components within normal limits    Narrative:     Specimen Source->Urine   URINALYSIS MICROSCOPIC - Abnormal; Notable for the following components:    RBC, UA 29 (*)     WBC, UA 36 (*)     Bacteria Moderate (*)     Hyaline Casts, UA 31 (*)     All other components within normal limits    Narrative:     Specimen Source->Urine   CULTURE, URINE   CBC W/ AUTO DIFFERENTIAL   LIPASE   CBC WITHOUT DIFFERENTIAL   POCT URINE PREGNANCY          Imaging Results              CT Abdomen Pelvis  Without Contrast (Final result)  Result time 08/13/23 22:05:24      Final result by Patrick Stern MD (08/13/23 22:05:24)                   Narrative:    PROCEDURE: CT ABDOMEN PELVIS WITHOUT IV CONTRAST, 8/13/2023 9:43 PM CDT    CLINICAL INDICATION:  Flank pain, kidney stone suspected.    COMPARISON: None    TECHNIQUE: Helical CT acquisition performed of the abdomen and pelvis with coronal and sagittal reformats. PO Contrast: None.  Complications: None.    CT Dose reduction techniques were utilized for this examination with 1 or more of the following: Automated exposure control and/or adjustment of the mA or kV according to patient size,  and/or use of iterative reconstruction technique.    FINDINGS:    Evaluation of vessels and solid organs limited without IV contrast.    Visualized chest base: Probable atelectasis in posterior lungs. Infiltrate or edema could also have a similar appearance.    Free air: None.    Hepatobiliary system: No contour deforming hepatic lesion.    Gallbladder: Irregular appearance of the gallbladder with numerous gallstones and faint strandy changes adjacent portions of the gallbladder, appearance is most suggestive of acute cholecystitis.    Spleen: Normal size.    Pancreas: No significant peripancreatic inflammatory changes.    Adrenal glands: No focal mass.    Kidneys and collecting system: No hydronephrosis.    Bowel: No small bowel obstruction.    Appendix: Normal caliber. No significant inflammatory changes.    Pelvic organs: Unremarkable urinary bladder. Uterus present. Grossly symmetric ovaries.    Bones: Prominent disc osteophyte complex at level of L3-4, advanced for patient age. This severely narrows the central spinal canal at this level.    IMPRESSION:    Irregular appearance of the gallbladder with numerous gallstones and faint strandy changes adjacent portions of the gallbladder, appearance is most suggestive of acute cholecystitis. Surgical consultation is advised.    Probable atelectasis in posterior lungs. Infiltrate or edema could also have a similar appearance.    Severe narrowing of central spinal canal at L3-4 due to prominent disc osteophyte complex, advanced disc complex and central spinal canal narrowing for patient age. Correlate for associated symptomatology. Dedicated MRI is advised for characterization when clinically appropriate.      Results were called to ordering provider at 8:05 PM central time.    Electronically signed by:  Patrick Stern MD  8/13/2023 10:05 PM CDT Workstation: [id number]                                     US Abdomen Limited (Final result)  Result time 08/13/23  20:28:42      Final result by Neno Bobo MD (08/13/23 20:28:42)                   Narrative:    EXAMINATION: US ABDOMEN LIMITED    COMPARISON: None available.    INDICATION: 33-year-old female with ruq abdominal pain    FINDINGS: Routine ultrasound evaluation of the right upper quadrant is performed. The IVC appears patent. The liver is mildly enlarged measuring up to 19 cm cephalocaudal with at least mild diffuse hepatic steatosis present. No solid or cystic hepatic lesions or peripheral nodularity is evident. The portal vein flow is hepatopedal.  The gallbladder contains multiple shadowing gallstones. No pathologic gallbladder wall thickening or pericholecystic fluid is seen. The common bile duct is normal caliber measuring 5 mm. The sonographic Sandoval's sign is positive. No ascites is evident. The pancreas is not included on the study. The right kidney measures 9.6 x 4.3 cm  and appears within normal limits.    IMPRESSION:  1. Cholelithiasis with a positive sonographic Sandoval sign. Surgical consultation is recommended to rule out acute cholecystitis.  2. Hepatomegaly and mild diffuse hepatic steatosis are incidentally noted.    Electronically signed by:  Neno Bobo MD  8/13/2023 8:28 PM CDT Workstation: [id number]                                     Medications   piperacillin-tazobactam 3.375 g in dextrose 5 % 100 mL IVPB (ready to mix) (3.375 g Intravenous New Bag 8/13/23 2210)   cefTRIAXone (ROCEPHIN) 1 g in dextrose 5 % 100 mL IVPB (ready to mix) (has no administration in time range)   sodium chloride 0.9% flush 10 mL (has no administration in time range)   naloxone 0.4 mg/mL injection 0.02 mg (has no administration in time range)   glucose chewable tablet 16 g (has no administration in time range)   glucose chewable tablet 24 g (has no administration in time range)   dextrose 50% injection 12.5 g (has no administration in time range)   dextrose 50% injection 25 g (has no administration in time range)    glucagon (human recombinant) injection 1 mg (has no administration in time range)   morphine injection 2 mg (has no administration in time range)   morphine injection 4 mg (4 mg Intravenous Given 8/13/23 2040)   ondansetron injection 4 mg (4 mg Intravenous Given 8/13/23 2041)   sodium chloride 0.9% bolus 1,000 mL 1,000 mL (0 mLs Intravenous Stopped 8/13/23 2157)     Medical Decision Making:   Initial Assessment:   33-year-old female with acute right upper abdominal pain.  She is very tender in the right upper abdomen.  She is no leukocytosis.  LFTs and lipase are normal.  Ultrasound shows numerous gallstones, no GB wall thickening or ductal dilatation however there is a positive sonographic Sandoval's sign.  UA shows some WBCs and RBCs.  CT abdomen was obtained which shows findings suspicious for acute cholecystitis as well, no ureterolithiasis or pyelonephritis.  Patient was given IV Zosyn.  She feels better after treatment with IV morphine.  I discussed with General surgery, Dr. Augustin.  Also discussed with hospitalist, Dr. Ramirez, who agrees to admit the patient for further management.                          Clinical Impression:   Final diagnoses:  [R10.11] RUQ abdominal pain (Primary)  [K81.0] Acute cholecystitis        ED Disposition Condition    Observation Stable                [place] CornerRomario tracy MD  08/13/23 [id number]

## 2023-08-14 NOTE — ASSESSMENT & PLAN NOTE
Appear clinical cholecystitis     Plan   Npo   IVF   Rocephin   Got zosyn one dose in ER   Surgeon consulted , not back yet  Most likely lap sophia in am   Morphine PRN

## 2023-08-14 NOTE — SUBJECTIVE & OBJECTIVE
Past Medical History:   Diagnosis Date    Allergy     Arthritis     Asthma        Past Surgical History:   Procedure Laterality Date    FRACTURE SURGERY         Review of patient's allergies indicates:  No Known Allergies    No current facility-administered medications on file prior to encounter.     Current Outpatient Medications on File Prior to Encounter   Medication Sig    drospirenone-ethinyl estradiol (KATE) 3-0.02 mg per tablet TAKE 1 TABLET BY MOUTH EVERY DAY    PRENATAL VITS-IRON FUM-FOLIC ORAL Take by mouth.    [DISCONTINUED] amoxicillin-clavulanate 875-125mg (AUGMENTIN) 875-125 mg per tablet Take 1 tablet by mouth every 12 (twelve) hours.     Family History       Problem Relation (Age of Onset)    Cancer Maternal Grandmother    Diabetes Mother    Heart disease Maternal Grandfather    Hypertension Mother    No Known Problems Father, Sister          Tobacco Use    Smoking status: Never    Smokeless tobacco: Never   Substance and Sexual Activity    Alcohol use: No    Drug use: No    Sexual activity: Yes     Partners: Male     Review of Systems   Constitutional:  Negative for activity change and fever.   Respiratory:  Negative for shortness of breath and wheezing.    Cardiovascular:  Negative for chest pain and leg swelling.   Gastrointestinal:  Positive for abdominal pain. Negative for abdominal distention.   Genitourinary:  Negative for difficulty urinating.   Skin:  Negative for color change.   Neurological:  Negative for dizziness and facial asymmetry.   Psychiatric/Behavioral:  Negative for agitation and confusion.      Objective:     Vital Signs (Most Recent):  Temp: 98 °F (36.7 °C) (08/13/23 1929)  Pulse: 71 (08/13/23 2130)  Resp: 18 (08/13/23 2040)  BP: (!) 152/87 (08/13/23 2130)  SpO2: 99 % (08/13/23 2130) Vital Signs (24h Range):  Temp:  [98 °F (36.7 °C)] 98 °F (36.7 °C)  Pulse:  [71-82] 71  Resp:  [17-18] 18  SpO2:  [99 %-100 %] 99 %  BP: (152-169)/(80-99) 152/87     Weight: 90.7 kg (200 lb)  Body  "mass index is 31.32 kg/m².     Physical Exam  Vitals and nursing note reviewed.   HENT:      Head: Normocephalic and atraumatic.   Eyes:      Conjunctiva/sclera: Conjunctivae normal.   Neck:      Vascular: No JVD.   Cardiovascular:      Heart sounds: Normal heart sounds.   Pulmonary:      Effort: Pulmonary effort is normal.      Breath sounds: Normal breath sounds.   Abdominal:      Palpations: Abdomen is soft.      Tenderness: There is abdominal tenderness.   Musculoskeletal:         General: Normal range of motion.   Skin:     General: Skin is warm.   Neurological:      Mental Status: She is alert and oriented to person, place, and time.   Psychiatric:         Mood and Affect: Mood normal.                Significant Labs: All pertinent labs within the past 24 hours have been reviewed.  CBC:   Recent Labs   Lab 08/13/23 2032   WBC 9.20   HGB 13.4   HCT 38.9        CMP:   Recent Labs   Lab 08/13/23 2032      K 3.7      CO2 25   *   BUN 13   CREATININE 0.8   CALCIUM 8.9   PROT 7.4   ALBUMIN 4.2   BILITOT 0.6   ALKPHOS 47*   AST 16   ALT 20   ANIONGAP 8     Cardiac Markers: No results for input(s): "CKMB", "MYOGLOBIN", "BNP", "TROPISTAT" in the last 48 hours.    Significant Imaging: I have reviewed all pertinent imaging results/findings within the past 24 hours.  "

## 2023-08-14 NOTE — ANESTHESIA POSTPROCEDURE EVALUATION
Anesthesia Post Evaluation    Patient: Kristina Blum    Procedure(s) Performed: Procedure(s) (LRB):  CHOLECYSTECTOMY, LAPAROSCOPIC (N/A)    Final Anesthesia Type: general      Patient location during evaluation: PACU  Patient participation: Yes- Able to Participate  Level of consciousness: awake and alert  Post-procedure vital signs: reviewed and stable  Pain management: adequate  Airway patency: patent    PONV status at discharge: No PONV  Anesthetic complications: no      Cardiovascular status: stable  Respiratory status: unassisted and spontaneous ventilation  Hydration status: euvolemic  Follow-up not needed.          Vitals Value Taken Time   /93 08/14/23 1524   Temp 36.7 °C (98.1 °F) 08/14/23 1524   Pulse 78 08/14/23 1524   Resp 16 08/14/23 1524   SpO2 94 % 08/14/23 1524         Event Time   Out of Recovery 08/14/2023 15:11:38         Pain/Joaquim Score: Pain Rating Prior to Med Admin: 6 (8/14/2023  2:42 PM)  Pain Rating Post Med Admin: 3 (8/13/2023  9:10 PM)  Joaquim Score: 10 (8/14/2023  3:00 PM)

## 2023-08-14 NOTE — PHARMACY MED REC
"              .    Admission Medication History     The home medication history was taken by Tamika Baca.    You may go to "Admission" then "Reconcile Home Medications" tabs to review and/or act upon these items.     The home medication list has been updated by the Pharmacy department.   Please read ALL comments highlighted in yellow.   Please address this information as you see fit.    Feel free to contact us if you have any questions or require assistance.      The medications listed below were removed from the home medication list. Please reorder if appropriate:  Patient reports no longer taking the following medication(s):  Prenatal Vits-Iron   Augmentin 874-125mg     Medications listed below were obtained from: Patient/family  No current facility-administered medications on file prior to encounter.     Current Outpatient Medications on File Prior to Encounter   Medication Sig Dispense Refill    dextroamphetamine-amphetamine (ADDERALL) 20 mg tablet Take 2 tablets by mouth once daily.      drospirenone-ethinyl estradiol (KATE) 3-0.02 mg per tablet TAKE 1 TABLET BY MOUTH EVERY DAY (Patient taking differently: Take 1 tablet by mouth once daily.) 28 tablet 0    [DISCONTINUED] PRENATAL VITS-IRON FUM-FOLIC ORAL Take by mouth.           Tamika Baca  [id number]          "

## 2023-08-14 NOTE — HPI
33-year-old female with no significant PMH came with complaining right upper right abdominal pain.   It  Pain began earlier today and constant and worsen with eating food but deny fever , chills or SOB but some nausea   US is positive but   She denies any dysuria or frequency.    US abdomen was done with Cholelithiasis with a positive sonographic Sandoval sign but no typical feature of cholecystitis and CT abdomen pending . CBD normal caliber .  On exam , no guarding and marked tenderness  in RHC most likely clinical cholecystitis . She is on OC  pill, overweight and female.

## 2023-08-15 PROBLEM — R82.90 ABNORMAL FINDING ON URINALYSIS: Status: RESOLVED | Noted: 2023-08-14 | Resolved: 2023-08-15

## 2023-08-15 LAB
ALBUMIN SERPL BCP-MCNC: 3.3 G/DL (ref 3.5–5.2)
ALP SERPL-CCNC: 46 U/L (ref 55–135)
ALT SERPL W/O P-5'-P-CCNC: 27 U/L (ref 10–44)
ANION GAP SERPL CALC-SCNC: 8 MMOL/L (ref 8–16)
AST SERPL-CCNC: 20 U/L (ref 10–40)
BACTERIA UR CULT: NORMAL
BACTERIA UR CULT: NORMAL
BASOPHILS # BLD AUTO: 0.03 K/UL (ref 0–0.2)
BASOPHILS NFR BLD: 0.4 % (ref 0–1.9)
BILIRUB SERPL-MCNC: 1.2 MG/DL (ref 0.1–1)
BUN SERPL-MCNC: 6 MG/DL (ref 6–20)
CALCIUM SERPL-MCNC: 8 MG/DL (ref 8.7–10.5)
CHLORIDE SERPL-SCNC: 109 MMOL/L (ref 95–110)
CO2 SERPL-SCNC: 21 MMOL/L (ref 23–29)
CREAT SERPL-MCNC: 0.7 MG/DL (ref 0.5–1.4)
DIFFERENTIAL METHOD: ABNORMAL
EOSINOPHIL # BLD AUTO: 0.1 K/UL (ref 0–0.5)
EOSINOPHIL NFR BLD: 0.8 % (ref 0–8)
ERYTHROCYTE [DISTWIDTH] IN BLOOD BY AUTOMATED COUNT: 12.7 % (ref 11.5–14.5)
EST. GFR  (NO RACE VARIABLE): >60 ML/MIN/1.73 M^2
GLUCOSE SERPL-MCNC: 88 MG/DL (ref 70–110)
HCT VFR BLD AUTO: 35.4 % (ref 37–48.5)
HGB BLD-MCNC: 11.8 G/DL (ref 12–16)
IMM GRANULOCYTES # BLD AUTO: 0.02 K/UL (ref 0–0.04)
IMM GRANULOCYTES NFR BLD AUTO: 0.2 % (ref 0–0.5)
LYMPHOCYTES # BLD AUTO: 1.1 K/UL (ref 1–4.8)
LYMPHOCYTES NFR BLD: 13 % (ref 18–48)
MCH RBC QN AUTO: 29.4 PG (ref 27–31)
MCHC RBC AUTO-ENTMCNC: 33.3 G/DL (ref 32–36)
MCV RBC AUTO: 88 FL (ref 82–98)
MONOCYTES # BLD AUTO: 0.5 K/UL (ref 0.3–1)
MONOCYTES NFR BLD: 6 % (ref 4–15)
NEUTROPHILS # BLD AUTO: 6.6 K/UL (ref 1.8–7.7)
NEUTROPHILS NFR BLD: 79.6 % (ref 38–73)
NRBC BLD-RTO: 0 /100 WBC
PLATELET # BLD AUTO: 196 K/UL (ref 150–450)
PMV BLD AUTO: 11.6 FL (ref 9.2–12.9)
POTASSIUM SERPL-SCNC: 3.5 MMOL/L (ref 3.5–5.1)
PROT SERPL-MCNC: 6 G/DL (ref 6–8.4)
RBC # BLD AUTO: 4.02 M/UL (ref 4–5.4)
SODIUM SERPL-SCNC: 138 MMOL/L (ref 136–145)
WBC # BLD AUTO: 8.3 K/UL (ref 3.9–12.7)

## 2023-08-15 PROCEDURE — 80053 COMPREHEN METABOLIC PANEL: CPT | Performed by: INTERNAL MEDICINE

## 2023-08-15 PROCEDURE — 25000003 PHARM REV CODE 250: Performed by: INTERNAL MEDICINE

## 2023-08-15 PROCEDURE — G0378 HOSPITAL OBSERVATION PER HR: HCPCS

## 2023-08-15 PROCEDURE — 96376 TX/PRO/DX INJ SAME DRUG ADON: CPT

## 2023-08-15 PROCEDURE — 85025 COMPLETE CBC W/AUTO DIFF WBC: CPT | Performed by: INTERNAL MEDICINE

## 2023-08-15 PROCEDURE — 63600175 PHARM REV CODE 636 W HCPCS: Performed by: INTERNAL MEDICINE

## 2023-08-15 PROCEDURE — 36415 COLL VENOUS BLD VENIPUNCTURE: CPT | Performed by: INTERNAL MEDICINE

## 2023-08-15 RX ADMIN — CEFTRIAXONE SODIUM 1 G: 1 INJECTION, POWDER, FOR SOLUTION INTRAMUSCULAR; INTRAVENOUS at 01:08

## 2023-08-15 RX ADMIN — ACETAMINOPHEN 650 MG: 325 TABLET ORAL at 11:08

## 2023-08-15 RX ADMIN — HYDROCODONE BITARTRATE AND ACETAMINOPHEN 1 TABLET: 5; 325 TABLET ORAL at 01:08

## 2023-08-15 RX ADMIN — MORPHINE SULFATE 2 MG: 2 INJECTION, SOLUTION INTRAMUSCULAR; INTRAVENOUS at 07:08

## 2023-08-15 RX ADMIN — HYDROCODONE BITARTRATE AND ACETAMINOPHEN 1 TABLET: 5; 325 TABLET ORAL at 08:08

## 2023-08-15 NOTE — PROGRESS NOTES
[facility] Medicine  Progress Note    Patient Name: Kristina Blum  MRN: [id number]  Patient Class: OP- Observation   Admission Date: 8/13/2023  Length of Stay: 0 days  Attending Physician: Meghann Dodson MD  Primary Care Provider: Jeansonne, Richard M. Jr., MD        Subjective:     Principal Problem:S/P laparoscopic cholecystectomy        HPI:  33-year-old female with no significant PMH came with complaining right upper right abdominal pain.   It  Pain began earlier today and constant and worsen with eating food but deny fever , chills or SOB but some nausea   US is positive but   She denies any dysuria or frequency.    US abdomen was done with Cholelithiasis with a positive sonographic Sandoval sign but no typical feature of cholecystitis and CT abdomen pending . CBD normal caliber .  On exam , no guarding and marked tenderness  in RHC most likely clinical cholecystitis . She is on OC  pill, overweight and female.      Overview/Hospital Course:  Patient presented with 4 hour history of abdominal pain.  Denies any urinary symptoms.  Afebrile, no leukocytosis, UA positive.  Ultrasound and CT with concern for acute calculous cholecystitis.  She was started on empiric antibiotics.  On 08/14 general surgery performed laparoscopic cholecystectomy.  Monitoring postoperatively.      Interval History:  Patient continues to report right upper quadrant pain, at time similar to her presenting pain, worse with deep inspiration.  No significant nausea and vomiting, was able to tolerate some regular diet this morning.  States she has only passed flatus once postoperatively.  Afebrile, T bilirubin 1.2.  Urine culture with multiple organisms, likely contaminant.  Discussed with patient.  Discussed with surgery and nursing.    Review of Systems   Constitutional:  Negative for fever.   Gastrointestinal:  Positive for abdominal pain.   Genitourinary:  Negative for dysuria.   Psychiatric/Behavioral:  Negative  for confusion.      Objective:     Vital Signs (Most Recent):  Temp: 98.4 °F (36.9 °C) (08/15/23 0651)  Pulse: 78 (08/15/23 0651)  Resp: 17 (08/15/23 0808)  BP: (!) 160/97 (08/15/23 0651)  SpO2: (!) 94 % (08/15/23 0651) Vital Signs (24h Range):  Temp:  [97.9 °F (36.6 °C)-98.8 °F (37.1 °C)] 98.4 °F (36.9 °C)  Pulse:  [67-88] 78  Resp:  [14-22] 17  SpO2:  [94 %-98 %] 94 %  BP: (138-161)/(73-97) 160/97     Weight: 93.6 kg (206 lb 4.8 oz)  Body mass index is 32.31 kg/m².    Intake/Output Summary (Last 24 hours) at 8/15/2023 1020  Last data filed at 8/15/2023 0855  Gross per 24 hour   Intake 1645 ml   Output --   Net 1645 ml         Physical Exam  Vitals and nursing note reviewed.   Constitutional:       Comments: Lying in bed, pleasant and cooperative   HENT:      Head: Normocephalic and atraumatic.      Mouth/Throat:      Mouth: Mucous membranes are moist.   Cardiovascular:      Rate and Rhythm: Normal rate and regular rhythm.      Comments: Wearing SCDs  Pulmonary:      Effort: No respiratory distress.      Breath sounds: No wheezing.   Abdominal:      Palpations: Abdomen is soft.      Comments: Abdominal striae present, well approximated laparoscopic incision, tender to palpate   Neurological:      Mental Status: She is alert and oriented to person, place, and time.   Psychiatric:         Mood and Affect: Mood normal.             Significant Labs: BMP:   Recent Labs   Lab 08/15/23  0336   GLU 88      K 3.5      CO2 21*   BUN 6   CREATININE 0.7   CALCIUM 8.0*     CBC:   Recent Labs   Lab 08/13/23 2032 08/14/23 0405 08/15/23  0336   WBC 9.20 8.34 8.30   HGB 13.4 12.5 11.8*   HCT 38.9 36.2* 35.4*    230 196     CMP:   Recent Labs   Lab 08/13/23  2032 08/15/23  0336    138   K 3.7 3.5    109   CO2 25 21*   * 88   BUN 13 6   CREATININE 0.8 0.7   CALCIUM 8.9 8.0*   PROT 7.4 6.0   ALBUMIN 4.2 3.3*   BILITOT 0.6 1.2*   ALKPHOS 47* 46*   AST 16 20   ALT 20 27   ANIONGAP 8 8     Cardiac  "Markers: No results for input(s): "CKMB", "MYOGLOBIN", "BNP", "TROPISTAT" in the last 48 hours.  Magnesium: No results for input(s): "MG" in the last 48 hours.    Significant Imaging: I have reviewed all pertinent imaging results/findings within the past 24 hours.      Assessment/Plan:      Active Hospital Problems    Diagnosis    *S/P laparoscopic cholecystectomy    Calculus of gallbladder with acute cholecystitis without obstruction    BMI 32.0-32.9,adult    Attention deficit hyperactivity disorder (ADHD)     Plan:  Continue care on medical floor  Post laparoscopic cholecystectomy care as per surgery  T bilirubin 1.2, still with significant pain, will continue monitoring over next 24 hours   Continue regular diet, tolerated breakfast this morning   As needed antiemetic and analgesic   Discontinue Rocephin, urine culture with multiple organisms  Serial abdominal examination   A.m. labs ordered  Appreciate general surgery input  Increase activity, out of bed to chair, ambulate  Further plan as per hospital course    VTE Risk Mitigation (From admission, onward)         Ordered     IP VTE HIGH RISK PATIENT  Once         08/13/23 2202     Place sequential compression device  Until discontinued         08/13/23 2202                Discharge Planning   ARTIS: 8/15/2023     Code Status: Full Code   Is the patient medically ready for discharge?:     Reason for patient still in hospital (select all that apply): Patient trending condition  Discharge Plan A: Home                  Meghann Dodson MD  Department of Hospital Medicine   [facility]  "

## 2023-08-15 NOTE — SUBJECTIVE & OBJECTIVE
Interval History:  Patient continues to report right upper quadrant pain, at time similar to her presenting pain, worse with deep inspiration.  No significant nausea and vomiting, was able to tolerate some regular diet this morning.  States she has only passed flatus once postoperatively.  Afebrile, T bilirubin 1.2.  Urine culture with multiple organisms, likely contaminant.  Discussed with patient.  Discussed with surgery and nursing.    Review of Systems   Constitutional:  Negative for fever.   Gastrointestinal:  Positive for abdominal pain.   Genitourinary:  Negative for dysuria.   Psychiatric/Behavioral:  Negative for confusion.      Objective:     Vital Signs (Most Recent):  Temp: 98.4 °F (36.9 °C) (08/15/23 0651)  Pulse: 78 (08/15/23 0651)  Resp: 17 (08/15/23 0808)  BP: (!) 160/97 (08/15/23 0651)  SpO2: (!) 94 % (08/15/23 0651) Vital Signs (24h Range):  Temp:  [97.9 °F (36.6 °C)-98.8 °F (37.1 °C)] 98.4 °F (36.9 °C)  Pulse:  [67-88] 78  Resp:  [14-22] 17  SpO2:  [94 %-98 %] 94 %  BP: (138-161)/(73-97) 160/97     Weight: 93.6 kg (206 lb 4.8 oz)  Body mass index is 32.31 kg/m².    Intake/Output Summary (Last 24 hours) at 8/15/2023 1020  Last data filed at 8/15/2023 0855  Gross per 24 hour   Intake 1645 ml   Output --   Net 1645 ml         Physical Exam  Vitals and nursing note reviewed.   Constitutional:       Comments: Lying in bed, pleasant and cooperative   HENT:      Head: Normocephalic and atraumatic.      Mouth/Throat:      Mouth: Mucous membranes are moist.   Cardiovascular:      Rate and Rhythm: Normal rate and regular rhythm.      Comments: Wearing SCDs  Pulmonary:      Effort: No respiratory distress.      Breath sounds: No wheezing.   Abdominal:      Palpations: Abdomen is soft.      Comments: Abdominal striae present, well approximated laparoscopic incision, tender to palpate   Neurological:      Mental Status: She is alert and oriented to person, place, and time.   Psychiatric:         Mood and  "Affect: Mood normal.             Significant Labs: BMP:   Recent Labs   Lab 08/15/23  0336   GLU 88      K 3.5      CO2 21*   BUN 6   CREATININE 0.7   CALCIUM 8.0*     CBC:   Recent Labs   Lab 08/13/23 2032 08/14/23 0405 08/15/23  0336   WBC 9.20 8.34 8.30   HGB 13.4 12.5 11.8*   HCT 38.9 36.2* 35.4*    230 196     CMP:   Recent Labs   Lab 08/13/23  2032 08/15/23  0336    138   K 3.7 3.5    109   CO2 25 21*   * 88   BUN 13 6   CREATININE 0.8 0.7   CALCIUM 8.9 8.0*   PROT 7.4 6.0   ALBUMIN 4.2 3.3*   BILITOT 0.6 1.2*   ALKPHOS 47* 46*   AST 16 20   ALT 20 27   ANIONGAP 8 8     Cardiac Markers: No results for input(s): "CKMB", "MYOGLOBIN", "BNP", "TROPISTAT" in the last 48 hours.  Magnesium: No results for input(s): "MG" in the last 48 hours.    Significant Imaging: I have reviewed all pertinent imaging results/findings within the past 24 hours.  "

## 2023-08-16 VITALS
TEMPERATURE: 98 F | WEIGHT: 206.31 LBS | DIASTOLIC BLOOD PRESSURE: 84 MMHG | OXYGEN SATURATION: 96 % | RESPIRATION RATE: 17 BRPM | BODY MASS INDEX: 32.38 KG/M2 | SYSTOLIC BLOOD PRESSURE: 157 MMHG | HEART RATE: 85 BPM | HEIGHT: 67 IN

## 2023-08-16 LAB
ALBUMIN SERPL BCP-MCNC: 3.2 G/DL (ref 3.5–5.2)
ALP SERPL-CCNC: 56 U/L (ref 55–135)
ALT SERPL W/O P-5'-P-CCNC: 51 U/L (ref 10–44)
ANION GAP SERPL CALC-SCNC: 6 MMOL/L (ref 8–16)
AST SERPL-CCNC: 26 U/L (ref 10–40)
BASOPHILS # BLD AUTO: 0.03 K/UL (ref 0–0.2)
BASOPHILS NFR BLD: 0.4 % (ref 0–1.9)
BILIRUB SERPL-MCNC: 0.4 MG/DL (ref 0.1–1)
BUN SERPL-MCNC: 11 MG/DL (ref 6–20)
CALCIUM SERPL-MCNC: 8.3 MG/DL (ref 8.7–10.5)
CHLORIDE SERPL-SCNC: 108 MMOL/L (ref 95–110)
CO2 SERPL-SCNC: 27 MMOL/L (ref 23–29)
CREAT SERPL-MCNC: 0.7 MG/DL (ref 0.5–1.4)
DIFFERENTIAL METHOD: ABNORMAL
EOSINOPHIL # BLD AUTO: 0.2 K/UL (ref 0–0.5)
EOSINOPHIL NFR BLD: 2.2 % (ref 0–8)
ERYTHROCYTE [DISTWIDTH] IN BLOOD BY AUTOMATED COUNT: 12.7 % (ref 11.5–14.5)
EST. GFR  (NO RACE VARIABLE): >60 ML/MIN/1.73 M^2
GLUCOSE SERPL-MCNC: 114 MG/DL (ref 70–110)
HCT VFR BLD AUTO: 36.5 % (ref 37–48.5)
HGB BLD-MCNC: 12.1 G/DL (ref 12–16)
IMM GRANULOCYTES # BLD AUTO: 0.02 K/UL (ref 0–0.04)
IMM GRANULOCYTES NFR BLD AUTO: 0.3 % (ref 0–0.5)
LYMPHOCYTES # BLD AUTO: 1.8 K/UL (ref 1–4.8)
LYMPHOCYTES NFR BLD: 22.3 % (ref 18–48)
MCH RBC QN AUTO: 29.7 PG (ref 27–31)
MCHC RBC AUTO-ENTMCNC: 33.2 G/DL (ref 32–36)
MCV RBC AUTO: 90 FL (ref 82–98)
MONOCYTES # BLD AUTO: 0.6 K/UL (ref 0.3–1)
MONOCYTES NFR BLD: 7.1 % (ref 4–15)
NEUTROPHILS # BLD AUTO: 5.4 K/UL (ref 1.8–7.7)
NEUTROPHILS NFR BLD: 67.7 % (ref 38–73)
NRBC BLD-RTO: 0 /100 WBC
PLATELET # BLD AUTO: 202 K/UL (ref 150–450)
PMV BLD AUTO: 11.7 FL (ref 9.2–12.9)
POTASSIUM SERPL-SCNC: 3.6 MMOL/L (ref 3.5–5.1)
PROT SERPL-MCNC: 6.3 G/DL (ref 6–8.4)
RBC # BLD AUTO: 4.07 M/UL (ref 4–5.4)
SODIUM SERPL-SCNC: 141 MMOL/L (ref 136–145)
WBC # BLD AUTO: 7.9 K/UL (ref 3.9–12.7)

## 2023-08-16 PROCEDURE — 36415 COLL VENOUS BLD VENIPUNCTURE: CPT | Performed by: INTERNAL MEDICINE

## 2023-08-16 PROCEDURE — 85025 COMPLETE CBC W/AUTO DIFF WBC: CPT | Performed by: INTERNAL MEDICINE

## 2023-08-16 PROCEDURE — 80053 COMPREHEN METABOLIC PANEL: CPT | Performed by: INTERNAL MEDICINE

## 2023-08-16 PROCEDURE — 25000003 PHARM REV CODE 250: Performed by: INTERNAL MEDICINE

## 2023-08-16 PROCEDURE — G0378 HOSPITAL OBSERVATION PER HR: HCPCS

## 2023-08-16 RX ORDER — HYDROCODONE BITARTRATE AND ACETAMINOPHEN 5; 325 MG/1; MG/1
1 TABLET ORAL EVERY 8 HOURS PRN
Qty: 10 TABLET | Refills: 0 | Status: SHIPPED | OUTPATIENT
Start: 2023-08-16 | End: 2023-08-21

## 2023-08-16 RX ADMIN — HYDROCODONE BITARTRATE AND ACETAMINOPHEN 1 TABLET: 5; 325 TABLET ORAL at 10:08

## 2023-08-16 NOTE — PLAN OF CARE
Patient cleared for discharge from case management standpoint.    Follow up appointments scheduled and added to AVS.    Chart and discharge orders reviewed.  Patient discharged home with no further case management needs.       08/16/23 0851   Final Note   Assessment Type Final Discharge Note   Anticipated Discharge Disposition Home   Hospital Resources/Appts/Education Provided Provided patient/caregiver with written discharge plan information;Appointments scheduled and added to AVS   Post-Acute Status   Discharge Delays None known at this time

## 2023-08-16 NOTE — NURSING
Patient discharged per orders, all instructions reviewed with patient, patient verbalized understanding.  IV discontinued x 1, Tele box discontinued x 1.  Patient transferred to wheelchair x 1, all belongings by patients side.  Patients spouse awaiting outside.

## 2023-08-16 NOTE — PROGRESS NOTES
No acute events overnight.  Tolerated diet.  Still with right upper quadrant abdominal pain worse with breathing. No fevers chills.    Temp:  [97.6 °F (36.4 °C)-98.6 °F (37 °C)] 98.1 °F (36.7 °C)  Pulse:  [71-85] 85  Resp:  [16-17] 17  SpO2:  [96 %-99 %] 96 %  BP: (136-157)/(77-93) 157/84  or    NAD, AAOx3  RRR  NLB  Abd soft, appropriate ttp, incisions intact  No jaundice    Tbili 1.2 -> 0.4    33-year-old female status post laparoscopic cholecystectomy for acute cholecystitis  -patient is slowly improving  -T bili and other LFTs are appropriate  -okay for discharge home from surgical standpoint  -can go home with Norco 5-325 x 20 tabs  -will arrange follow-up with me in 2 weeks

## 2023-08-16 NOTE — DISCHARGE SUMMARY
[facility] Medicine  Discharge Summary      Patient Name: Kristina Blum  MRN: [id number]  LOIS: [id number]  Patient Class: OP- Observation  Admission Date: 8/13/2023  Hospital Length of Stay: 0 days  Discharge Date and Time: 8/16/2023 12:50 PM  Attending Physician: No att. providers found   Discharging Provider: Meghann Dodson MD  Primary Care Provider: Jeansonne, Richard M. Jr., MD    Primary Care Team: Networked reference to record PCT     HPI:   33-year-old female with no significant PMH came with complaining right upper right abdominal pain.   It  Pain began earlier today and constant and worsen with eating food but deny fever , chills or SOB but some nausea   US is positive but   She denies any dysuria or frequency.    US abdomen was done with Cholelithiasis with a positive sonographic Sandoval sign but no typical feature of cholecystitis and CT abdomen pending . CBD normal caliber .  On exam , no guarding and marked tenderness  in RHC most likely clinical cholecystitis . She is on OC  pill, overweight and female.      Procedure(s) (LRB):  CHOLECYSTECTOMY, LAPAROSCOPIC (N/A)      Hospital Course:   Patient presented with 4 hour history of abdominal pain.  Denies any urinary symptoms.  Afebrile, no leukocytosis, UA positive subsequent culture with multiple organisms and likely contaminant.  Ultrasound and CT with concern for acute calculous cholecystitis.  She was started on empiric antibiotics.  On 08/14 general surgery performed laparoscopic cholecystectomy.  She was monitored postoperatively, diet slowly advanced, serial abdominal examination, increase activity.  T bilirubin did increase to 1.2 but subsequently downtrended to 0.4.  On 08/16 tolerating oral diet, abdominal pain better control, passing flatus, she has ambulated around the unit.  Cleared by consultant for discharge and appears medically stable for discharge.  Laparoscopic cholecystectomy precautions/instructions reviewed  with patient, educational material provided.  Discharge plan including medication, follow-up as well as return precautions were reviewed with the patient, she expressed understanding, no questions or concerns.  Discussed with nursing.      Discharge examination  Lying in bed, alert, oriented, on room air, abdomen soft, laparoscopic incision well approximated, bowel sounds heard       Goals of Care Treatment Preferences:  Code Status: Full Code      Consults:   Consults (From admission, onward)        Status Ordering Provider     Inpatient consult to General surgery  Once        Provider:  Rony Augustin MD    Completed ИРИНА ANTOINE          No new Assessment & Plan notes have been filed under this hospital service since the last note was generated.  Service: Hospital Medicine    Final Active Diagnoses:    Diagnosis Date Noted POA    PRINCIPAL PROBLEM:  S/P laparoscopic cholecystectomy [Z90.49] 08/14/2023 Not Applicable    Calculus of gallbladder with acute cholecystitis without obstruction [K80.00] 08/13/2023 Yes    BMI 32.0-32.9,adult [Z68.32] 03/07/2018 Not Applicable    Attention deficit hyperactivity disorder (ADHD) [F90.9] 02/06/2018 Yes      Problems Resolved During this Admission:    Diagnosis Date Noted Date Resolved POA    Abnormal finding on urinalysis [R82.90] 08/14/2023 08/15/2023 Yes       Discharged Condition: good    Disposition: Home or Self Care    Follow Up:   Follow-up Information     Jeansonne, Richard M. Jr., MD. Go on 8/22/2023.    Specialty: Family Medicine  Why: 10:00 am    follow up  Contact information:  [address] [place] [address]  [phone]             Alex Mckeon Jr., MD. Go on 8/30/2023.    Specialty: General Surgery  Why: 11:00 am  follow up  Contact information:  [address] [facility]  [address]  [facility] [address]  [phone]                       Patient Instructions:      Diet Adult Regular     Lifting restrictions   Order Comments: No lifting more than 10  "lbs     Notify your health care provider if you experience any of the following:  temperature >100.4     Notify your health care provider if you experience any of the following:  redness, tenderness, or signs of infection (pain, swelling, redness, odor or green/yellow discharge around incision site)     Notify your health care provider if you experience any of the following:  severe uncontrolled pain     Activity as tolerated       Significant Diagnostic Studies: Labs:   BMP:   Recent Labs   Lab 08/15/23  0336 08/16/23  0343   GLU 88 114*    141   K 3.5 3.6    108   CO2 21* 27   BUN 6 11   CREATININE 0.7 0.7   CALCIUM 8.0* 8.3*   , CMP   Recent Labs   Lab 08/15/23  0336 08/16/23  0343    141   K 3.5 3.6    108   CO2 21* 27   GLU 88 114*   BUN 6 11   CREATININE 0.7 0.7   CALCIUM 8.0* 8.3*   PROT 6.0 6.3   ALBUMIN 3.3* 3.2*   BILITOT 1.2* 0.4   ALKPHOS 46* 56   AST 20 26   ALT 27 51*   ANIONGAP 8 6*   , CBC   Recent Labs   Lab 08/15/23  0336 08/16/23  0344   WBC 8.30 7.90   HGB 11.8* 12.1   HCT 35.4* 36.5*    202   , INR No results found for: "INR", "PROTIME", Lipid Panel No results found for: "CHOL", "HDL", "LDLCALC", "TRIG", "CHOLHDL", Troponin No results for input(s): "TROPONINI" in the last 168 hours. and A1C: No results for input(s): "HGBA1C" in the last 4320 hours.    Pending Diagnostic Studies:     None       CT Abdomen Pelvis  Without Contrast    Result Date: 8/13/2023  PROCEDURE: CT ABDOMEN PELVIS WITHOUT IV CONTRAST, 8/13/2023 9:43 PM CDT CLINICAL INDICATION:  Flank pain, kidney stone suspected. COMPARISON: None TECHNIQUE: Helical CT acquisition performed of the abdomen and pelvis with coronal and sagittal reformats. PO Contrast: None.  Complications: None. CT Dose reduction techniques were utilized for this examination with 1 or more of the following: Automated exposure control and/or adjustment of the mA or kV according to patient size, and/or use of iterative reconstruction " technique. FINDINGS: Evaluation of vessels and solid organs limited without IV contrast. Visualized chest base: Probable atelectasis in posterior lungs. Infiltrate or edema could also have a similar appearance. Free air: None. Hepatobiliary system: No contour deforming hepatic lesion. Gallbladder: Irregular appearance of the gallbladder with numerous gallstones and faint strandy changes adjacent portions of the gallbladder, appearance is most suggestive of acute cholecystitis. Spleen: Normal size. Pancreas: No significant peripancreatic inflammatory changes. Adrenal glands: No focal mass. Kidneys and collecting system: No hydronephrosis. Bowel: No small bowel obstruction. Appendix: Normal caliber. No significant inflammatory changes. Pelvic organs: Unremarkable urinary bladder. Uterus present. Grossly symmetric ovaries. Bones: Prominent disc osteophyte complex at level of L3-4, advanced for patient age. This severely narrows the central spinal canal at this level. IMPRESSION: Irregular appearance of the gallbladder with numerous gallstones and faint strandy changes adjacent portions of the gallbladder, appearance is most suggestive of acute cholecystitis. Surgical consultation is advised. Probable atelectasis in posterior lungs. Infiltrate or edema could also have a similar appearance. Severe narrowing of central spinal canal at L3-4 due to prominent disc osteophyte complex, advanced disc complex and central spinal canal narrowing for patient age. Correlate for associated symptomatology. Dedicated MRI is advised for characterization when clinically appropriate. Results were called to ordering provider at 8:05 PM central time. Electronically signed by:  Patrick Stern MD  8/13/2023 10:05 PM CDT Workstation: [id number]    US Abdomen Limited    Result Date: 8/13/2023  EXAMINATION: US ABDOMEN LIMITED COMPARISON: None available. INDICATION: 33-year-old female with ruq abdominal pain FINDINGS: Routine ultrasound  evaluation of the right upper quadrant is performed. The IVC appears patent. The liver is mildly enlarged measuring up to 19 cm cephalocaudal with at least mild diffuse hepatic steatosis present. No solid or cystic hepatic lesions or peripheral nodularity is evident. The portal vein flow is hepatopedal.  The gallbladder contains multiple shadowing gallstones. No pathologic gallbladder wall thickening or pericholecystic fluid is seen. The common bile duct is normal caliber measuring 5 mm. The sonographic Sandoval's sign is positive. No ascites is evident. The pancreas is not included on the study. The right kidney measures 9.6 x 4.3 cm  and appears within normal limits. IMPRESSION: 1. Cholelithiasis with a positive sonographic Sandoval sign. Surgical consultation is recommended to rule out acute cholecystitis. 2. Hepatomegaly and mild diffuse hepatic steatosis are incidentally noted. Electronically signed by:  Neno Bobo MD  8/13/2023 8:28 PM CDT Workstation: [id number]    Medications:  Reconciled Home Medications:      Medication List      START taking these medications    HYDROcodone-acetaminophen 5-325 mg per tablet  Commonly known as: NORCO  Take 1 tablet by mouth every 8 (eight) hours as needed for Pain.        CHANGE how you take these medications    drospirenone-ethinyl estradioL 3-0.02 mg per tablet  Commonly known as: KATE  TAKE 1 TABLET BY MOUTH EVERY DAY  What changed: when to take this        CONTINUE taking these medications    AdderalL 20 mg tablet  Generic drug: dextroamphetamine-amphetamine  Take 2 tablets by mouth once daily.            Indwelling Lines/Drains at time of discharge:   Lines/Drains/Airways     None                 Time spent on the discharge of patient: 32 minutes         Meghann Dodson MD  Department of Hospital Medicine  [facility]

## 2023-08-30 ENCOUNTER — OFFICE VISIT (OUTPATIENT)
Dept: SURGERY | Facility: CLINIC | Age: 33
End: 2023-08-30
Payer: MEDICAID

## 2023-08-30 VITALS
DIASTOLIC BLOOD PRESSURE: 87 MMHG | HEART RATE: 102 BPM | SYSTOLIC BLOOD PRESSURE: 131 MMHG | WEIGHT: 206 LBS | BODY MASS INDEX: 32.33 KG/M2 | TEMPERATURE: 97 F | HEIGHT: 67 IN

## 2023-08-30 DIAGNOSIS — Z90.49 S/P LAPAROSCOPIC CHOLECYSTECTOMY: Primary | ICD-10-CM

## 2023-08-30 PROCEDURE — 3079F DIAST BP 80-89 MM HG: CPT | Mod: CPTII,S$GLB,, | Performed by: SURGERY

## 2023-08-30 PROCEDURE — 3008F PR BODY MASS INDEX (BMI) DOCUMENTED: ICD-10-PCS | Mod: CPTII,S$GLB,, | Performed by: SURGERY

## 2023-08-30 PROCEDURE — 3079F PR MOST RECENT DIASTOLIC BLOOD PRESSURE 80-89 MM HG: ICD-10-PCS | Mod: CPTII,S$GLB,, | Performed by: SURGERY

## 2023-08-30 PROCEDURE — 99024 POSTOP FOLLOW-UP VISIT: CPT | Mod: S$GLB,,, | Performed by: SURGERY

## 2023-08-30 PROCEDURE — 3008F BODY MASS INDEX DOCD: CPT | Mod: CPTII,S$GLB,, | Performed by: SURGERY

## 2023-08-30 PROCEDURE — 99024 PR POST-OP FOLLOW-UP VISIT: ICD-10-PCS | Mod: S$GLB,,, | Performed by: SURGERY

## 2023-08-30 PROCEDURE — 3075F SYST BP GE 130 - 139MM HG: CPT | Mod: CPTII,S$GLB,, | Performed by: SURGERY

## 2023-08-30 PROCEDURE — 3075F PR MOST RECENT SYSTOLIC BLOOD PRESS GE 130-139MM HG: ICD-10-PCS | Mod: CPTII,S$GLB,, | Performed by: SURGERY

## 2023-08-30 PROCEDURE — 1159F PR MEDICATION LIST DOCUMENTED IN MEDICAL RECORD: ICD-10-PCS | Mod: CPTII,S$GLB,, | Performed by: SURGERY

## 2023-08-30 PROCEDURE — 1159F MED LIST DOCD IN RCRD: CPT | Mod: CPTII,S$GLB,, | Performed by: SURGERY

## 2023-08-30 NOTE — PROGRESS NOTES
GENERAL SURGERY  POST-OP PROGRESS NOTE    HPI: Kristina Blum is a 33 y.o. female status post laparoscopic cholecystectomy for acute cholecystitis here for follow-up. Patient reports she still has some occasional pain in the right upper abdomen and also occasionally in the left upper abdomen. This is usually self resolved. Denies nausea or vomiting.  Appetite not back to normal yet however. No increase in urgency with stools. No fevers, chills, yellowing of the skin or eyes    VITALS:  Vitals:    08/30/23 1120   BP: 131/87   Pulse: 102   Temp: 97.4 °F (36.3 °C)       PHYSICAL EXAM:  Incisions intact without signs of infection    PATHOLOGY:  GALLBLADDER, CHOLECYSTECTOMY   - CHRONIC CHOLECYSTITIS   - CHOLELITHIASIS     ASSESSMENT & PLAN:  33 y.o. female s/p laparoscopic cholecystectomy  -preoperative symptoms improved though still with some postoperative discomfort in the right upper quadrant  -recommend continued observation for now, if symptoms worsen or fail to improve over the next 1-2 weeks will consider labs and imaging  -pathology benign  -incisions healing well  -okay to return to work  -return to clinic p.r.n.

## 2024-10-08 ENCOUNTER — HOSPITAL ENCOUNTER (EMERGENCY)
Facility: HOSPITAL | Age: 34
Discharge: HOME | End: 2024-10-08
Attending: EMERGENCY MEDICINE
Payer: MEDICAID

## 2024-10-08 VITALS
DIASTOLIC BLOOD PRESSURE: 83 MMHG | SYSTOLIC BLOOD PRESSURE: 140 MMHG | HEART RATE: 80 BPM | TEMPERATURE: 98 F | OXYGEN SATURATION: 98 % | RESPIRATION RATE: 16 BRPM | WEIGHT: 182 LBS | BODY MASS INDEX: 28.56 KG/M2 | HEIGHT: 67 IN

## 2024-10-08 DIAGNOSIS — N39.0 URINARY TRACT INFECTION WITHOUT HEMATURIA, SITE UNSPECIFIED: ICD-10-CM

## 2024-10-08 DIAGNOSIS — N83.202 CYST OF LEFT OVARY: ICD-10-CM

## 2024-10-08 DIAGNOSIS — R19.7 DIARRHEA, UNSPECIFIED TYPE: ICD-10-CM

## 2024-10-08 DIAGNOSIS — R11.2 NAUSEA AND VOMITING, UNSPECIFIED VOMITING TYPE: Primary | ICD-10-CM

## 2024-10-08 LAB
ALBUMIN SERPL BCP-MCNC: 4.8 G/DL (ref 3.5–5.2)
ALP SERPL-CCNC: 50 U/L (ref 55–135)
ALT SERPL W/O P-5'-P-CCNC: 27 U/L (ref 10–44)
ANION GAP SERPL CALC-SCNC: 7 MMOL/L (ref 8–16)
AST SERPL-CCNC: 16 U/L (ref 10–40)
B-HCG UR QL: NEGATIVE
BACTERIA #/AREA URNS HPF: ABNORMAL /HPF
BASOPHILS # BLD AUTO: 0.05 K/UL (ref 0–0.2)
BASOPHILS NFR BLD: 0.4 % (ref 0–1.9)
BILIRUB SERPL-MCNC: 0.6 MG/DL (ref 0.1–1)
BILIRUB UR QL STRIP: NEGATIVE
BILIRUB UR QL STRIP: NEGATIVE
BUN SERPL-MCNC: 12 MG/DL (ref 6–20)
CALCIUM SERPL-MCNC: 9.3 MG/DL (ref 8.7–10.5)
CHLORIDE SERPL-SCNC: 107 MMOL/L (ref 95–110)
CLARITY UR: CLEAR
CLARITY UR: CLEAR
CO2 SERPL-SCNC: 26 MMOL/L (ref 23–29)
COLOR UR: YELLOW
COLOR UR: YELLOW
CREAT SERPL-MCNC: 0.8 MG/DL (ref 0.5–1.4)
CREAT SERPL-MCNC: 0.9 MG/DL (ref 0.5–1.4)
CTP QC/QA: YES
DIFFERENTIAL METHOD BLD: ABNORMAL
EOSINOPHIL # BLD AUTO: 0.1 K/UL (ref 0–0.5)
EOSINOPHIL NFR BLD: 1 % (ref 0–8)
ERYTHROCYTE [DISTWIDTH] IN BLOOD BY AUTOMATED COUNT: 13.1 % (ref 11.5–14.5)
EST. GFR  (NO RACE VARIABLE): >60 ML/MIN/1.73 M^2
GLUCOSE SERPL-MCNC: 91 MG/DL (ref 70–110)
GLUCOSE UR QL STRIP: NEGATIVE
GLUCOSE UR QL STRIP: NEGATIVE
HCT VFR BLD AUTO: 46.1 % (ref 37–48.5)
HGB BLD-MCNC: 15.9 G/DL (ref 12–16)
HGB UR QL STRIP: ABNORMAL
HGB UR QL STRIP: ABNORMAL
HYALINE CASTS #/AREA URNS LPF: 30 /LPF
IMM GRANULOCYTES # BLD AUTO: 0.04 K/UL (ref 0–0.04)
IMM GRANULOCYTES NFR BLD AUTO: 0.3 % (ref 0–0.5)
KETONES UR QL STRIP: NEGATIVE
KETONES UR QL STRIP: NEGATIVE
LEUKOCYTE ESTERASE UR QL STRIP: ABNORMAL
LEUKOCYTE ESTERASE UR QL STRIP: ABNORMAL
LIPASE SERPL-CCNC: 34 U/L (ref 4–60)
LYMPHOCYTES # BLD AUTO: 1.9 K/UL (ref 1–4.8)
LYMPHOCYTES NFR BLD: 16.2 % (ref 18–48)
MCH RBC QN AUTO: 30.1 PG (ref 27–31)
MCHC RBC AUTO-ENTMCNC: 34.5 G/DL (ref 32–36)
MCV RBC AUTO: 87 FL (ref 82–98)
MICROSCOPIC COMMENT: ABNORMAL
MONOCYTES # BLD AUTO: 0.7 K/UL (ref 0.3–1)
MONOCYTES NFR BLD: 6.2 % (ref 4–15)
NEUTROPHILS # BLD AUTO: 9 K/UL (ref 1.8–7.7)
NEUTROPHILS NFR BLD: 75.9 % (ref 38–73)
NITRITE UR QL STRIP: POSITIVE
NITRITE UR QL STRIP: POSITIVE
NRBC BLD-RTO: 0 /100 WBC
PH UR STRIP: 6 [PH] (ref 5–8)
PH UR STRIP: 6 [PH] (ref 5–8)
PLATELET # BLD AUTO: 311 K/UL (ref 150–450)
PMV BLD AUTO: 11.3 FL (ref 9.2–12.9)
POTASSIUM SERPL-SCNC: 3.2 MMOL/L (ref 3.5–5.1)
PROT SERPL-MCNC: 7.9 G/DL (ref 6–8.4)
PROT UR QL STRIP: ABNORMAL
PROT UR QL STRIP: ABNORMAL
RBC # BLD AUTO: 5.29 M/UL (ref 4–5.4)
RBC #/AREA URNS HPF: 2 /HPF (ref 0–4)
SAMPLE: NORMAL
SODIUM SERPL-SCNC: 140 MMOL/L (ref 136–145)
SP GR UR STRIP: 1.03 (ref 1–1.03)
SP GR UR STRIP: 1.03 (ref 1–1.03)
SQUAMOUS #/AREA URNS HPF: 1 /HPF
URN SPEC COLLECT METH UR: ABNORMAL
URN SPEC COLLECT METH UR: ABNORMAL
UROBILINOGEN UR STRIP-ACNC: NEGATIVE EU/DL
UROBILINOGEN UR STRIP-ACNC: NEGATIVE EU/DL
WBC # BLD AUTO: 11.87 K/UL (ref 3.9–12.7)
WBC #/AREA URNS HPF: 4 /HPF (ref 0–5)

## 2024-10-08 PROCEDURE — 82565 ASSAY OF CREATININE: CPT

## 2024-10-08 PROCEDURE — 96365 THER/PROPH/DIAG IV INF INIT: CPT

## 2024-10-08 PROCEDURE — 99285 EMERGENCY DEPT VISIT HI MDM: CPT | Mod: 25

## 2024-10-08 PROCEDURE — 85025 COMPLETE CBC W/AUTO DIFF WBC: CPT | Performed by: NURSE PRACTITIONER

## 2024-10-08 PROCEDURE — 83690 ASSAY OF LIPASE: CPT | Performed by: NURSE PRACTITIONER

## 2024-10-08 PROCEDURE — 25500020 PHARM REV CODE 255: Performed by: NURSE PRACTITIONER

## 2024-10-08 PROCEDURE — 80053 COMPREHEN METABOLIC PANEL: CPT | Performed by: NURSE PRACTITIONER

## 2024-10-08 PROCEDURE — 81001 URINALYSIS AUTO W/SCOPE: CPT | Performed by: NURSE PRACTITIONER

## 2024-10-08 PROCEDURE — 63600175 PHARM REV CODE 636 W HCPCS: Performed by: NURSE PRACTITIONER

## 2024-10-08 PROCEDURE — 96361 HYDRATE IV INFUSION ADD-ON: CPT

## 2024-10-08 PROCEDURE — 96375 TX/PRO/DX INJ NEW DRUG ADDON: CPT

## 2024-10-08 PROCEDURE — 81025 URINE PREGNANCY TEST: CPT | Performed by: NURSE PRACTITIONER

## 2024-10-08 PROCEDURE — 25000003 PHARM REV CODE 250: Performed by: NURSE PRACTITIONER

## 2024-10-08 RX ORDER — ONDANSETRON HYDROCHLORIDE 2 MG/ML
4 INJECTION, SOLUTION INTRAVENOUS
Status: COMPLETED | OUTPATIENT
Start: 2024-10-08 | End: 2024-10-08

## 2024-10-08 RX ORDER — CEFUROXIME AXETIL 500 MG/1
500 TABLET ORAL EVERY 12 HOURS
Qty: 20 TABLET | Refills: 0 | Status: SHIPPED | OUTPATIENT
Start: 2024-10-08 | End: 2024-10-18

## 2024-10-08 RX ORDER — POTASSIUM CHLORIDE 20 MEQ/1
40 TABLET, EXTENDED RELEASE ORAL
Status: COMPLETED | OUTPATIENT
Start: 2024-10-08 | End: 2024-10-08

## 2024-10-08 RX ORDER — ONDANSETRON 4 MG/1
4 TABLET, ORALLY DISINTEGRATING ORAL EVERY 8 HOURS PRN
Qty: 12 TABLET | Refills: 0 | Status: SHIPPED | OUTPATIENT
Start: 2024-10-08

## 2024-10-08 RX ADMIN — SODIUM CHLORIDE 1000 ML: 9 INJECTION, SOLUTION INTRAVENOUS at 12:10

## 2024-10-08 RX ADMIN — IOHEXOL 100 ML: 350 INJECTION, SOLUTION INTRAVENOUS at 02:10

## 2024-10-08 RX ADMIN — ONDANSETRON 4 MG: 2 INJECTION INTRAMUSCULAR; INTRAVENOUS at 03:10

## 2024-10-08 RX ADMIN — POTASSIUM CHLORIDE 40 MEQ: 1500 TABLET, EXTENDED RELEASE ORAL at 02:10

## 2024-10-08 RX ADMIN — CEFTRIAXONE SODIUM 1 G: 1 INJECTION, POWDER, FOR SOLUTION INTRAMUSCULAR; INTRAVENOUS at 03:10

## 2024-10-08 NOTE — ED PROVIDER NOTES
"Encounter Date: 10/8/2024       History     Chief Complaint   Patient presents with    Abdominal Pain     Pt has abd pain that started last night severe. She flagyl on the 4th possible gastro virus going around.      Presents with complaint nausea vomiting and diarrhea.  Patient reports she was had diarrhea for 1 week.  She was seen Wednesday by her PCP and given Flagyl 3 times a day for 10 days.  She reports that he was as a virus that is "sort of like salmonella".  Patient denies odor.  She reports abdominal pain.  Her abdominal pain is mid abdomen.  She denies being around anyone that is sick.  She had a globulin year.  She rates her abdominal pain as a 4/10 currently.  States that last night it was worse.  It waxes and wanes.  She was only vomited once this morning.  States she vomited multiple times yesterday.  Her stools are liquid.      Review of patient's allergies indicates:  No Known Allergies  Past Medical History:   Diagnosis Date    Allergy     Arthritis     Asthma      Past Surgical History:   Procedure Laterality Date    FRACTURE SURGERY      LAPAROSCOPIC CHOLECYSTECTOMY N/A 8/14/2023    Procedure: CHOLECYSTECTOMY, LAPAROSCOPIC;  Surgeon: Alex Mckeon Jr., MD;  Location: [facility];  Service: General;  Laterality: N/A;     Family History   Problem Relation Name Age of Onset    Hypertension Mother      Diabetes Mother      No Known Problems Father      No Known Problems Sister      Cancer Maternal Grandmother      Heart disease Maternal Grandfather       Social History     Tobacco Use    Smoking status: Never    Smokeless tobacco: Never   Substance Use Topics    Alcohol use: No    Drug use: No     Review of Systems   Constitutional:  Negative for chills and fever.   Respiratory:  Negative for cough, shortness of breath and wheezing.    Cardiovascular:  Negative for chest pain, palpitations and leg swelling.   Gastrointestinal:  Positive for abdominal pain, diarrhea, nausea and vomiting. Negative " for abdominal distention, blood in stool and constipation.   Genitourinary:  Negative for decreased urine volume, difficulty urinating, dysuria and frequency.   Musculoskeletal:  Negative for back pain and gait problem.   Skin:  Negative for rash.   Neurological:  Negative for dizziness and weakness.       Physical Exam     Initial Vitals [10/08/24 1202]   BP Pulse Resp Temp SpO2   (!) 142/93 95 18 98.8 °F (37.1 °C) 98 %      MAP       --         Physical Exam    Constitutional: She appears well-developed and well-nourished.   HENT:   Head: Normocephalic. Mouth/Throat: Oropharynx is clear and moist.   Eyes: Conjunctivae are normal.   Neck: Neck supple.   Normal range of motion.  Cardiovascular:  Normal rate, regular rhythm and normal heart sounds.           Pulmonary/Chest: Breath sounds normal. No respiratory distress.   Abdominal: Abdomen is soft. Bowel sounds are normal. She exhibits no distension. There is abdominal tenderness.   There is mild tenderness to the umbilicus and slightly below.  It was no suprapubic tenderness.  No rebound.  No distention.  No guarding.  Bowel sounds are active all 4 quadrants. There is no rebound and no guarding.   Musculoskeletal:         General: Normal range of motion.      Cervical back: Normal range of motion and neck supple.      Comments: Patient was ambulatory per self her gait is steady.     Neurological: She is alert and oriented to person, place, and time. No sensory deficit. GCS score is 15. GCS eye subscore is 4. GCS verbal subscore is 5. GCS motor subscore is 6.   Skin: Skin is warm and dry. Capillary refill takes less than 2 seconds.   Psychiatric: She has a normal mood and affect. Thought content normal.         ED Course   Procedures  Labs Reviewed   CBC W/ AUTO DIFFERENTIAL - Abnormal       Result Value    WBC 11.87      RBC 5.29      Hemoglobin 15.9      Hematocrit 46.1      MCV 87      MCH 30.1      MCHC 34.5      RDW 13.1      Platelets 311      MPV 11.3       Immature Granulocytes 0.3      Gran # (ANC) 9.0 (*)     Immature Grans (Abs) 0.04      Lymph # 1.9      Mono # 0.7      Eos # 0.1      Baso # 0.05      nRBC 0      Gran % 75.9 (*)     Lymph % 16.2 (*)     Mono % 6.2      Eosinophil % 1.0      Basophil % 0.4      Differential Method Automated     COMPREHENSIVE METABOLIC PANEL - Abnormal    Sodium 140      Potassium 3.2 (*)     Chloride 107      CO2 26      Glucose 91      BUN 12      Creatinine 0.8      Calcium 9.3      Total Protein 7.9      Albumin 4.8      Total Bilirubin 0.6      Alkaline Phosphatase 50 (*)     AST 16      ALT 27      eGFR >60.0      Anion Gap 7 (*)    URINALYSIS, REFLEX TO URINE CULTURE - Abnormal    Specimen UA Urine, Clean Catch      Color, UA Yellow      Appearance, UA Clear      pH, UA 6.0      Specific Gravity, UA 1.030      Protein, UA 1+ (*)     Glucose, UA Negative      Ketones, UA Negative      Bilirubin (UA) Negative      Occult Blood UA TRACE      Nitrite, UA Positive (*)     Urobilinogen, UA Negative      Leukocytes, UA 1+ (*)     Narrative:     Specimen Source->Urine   URINALYSIS, REFLEX TO URINE CULTURE - Abnormal    Specimen UA Urine, Clean Catch      Color, UA Yellow      Appearance, UA Clear      pH, UA 6.0      Specific Gravity, UA 1.030      Protein, UA 1+ (*)     Glucose, UA Negative      Ketones, UA Negative      Bilirubin (UA) Negative      Occult Blood UA TRACE      Nitrite, UA Positive (*)     Urobilinogen, UA Negative      Leukocytes, UA 1+ (*)     Narrative:     Specimen Source->Urine   URINALYSIS MICROSCOPIC - Abnormal    RBC, UA 2      WBC, UA 4      Bacteria Moderate (*)     Squam Epithel, UA 1      Hyaline Casts, UA 30 (*)     Microscopic Comment SEE COMMENT      Narrative:     Specimen Source->Urine   LIPASE    Lipase 34     POCT URINE PREGNANCY    POC Preg Test, Ur Negative       Acceptable Yes     ISTAT CREATININE    POC Creatinine 0.9      Sample VENOUS            Imaging Results              CT  Abdomen Pelvis With IV Contrast NO Oral Contrast (Final result)  Result time 10/08/24 14:42:31      Final result by Jaxon Kirk IV, MD (10/08/24 14:42:31)                   Impression:      Mild distension of the colon with scattered colonic air-fluid levels as could be associated with diarrheal illness.    Fat containing umbilical hernia.    17 mm probable involuting left ovarian follicle.      Electronically signed by: Jaxon Kirk  Date:    10/08/2024  Time:    14:42               Narrative:      CMS MANDATED QUALITY DATA - CT RADIATION - 436    All CT scans at this facility utilize dose modulation, iterative reconstruction, and/or weight based dosing when appropriate to reduce radiation dose to as low as reasonably achievable.    EXAMINATION:  CT ABDOMEN PELVIS WITH IV CONTRAST    CLINICAL HISTORY:  Abdominal pain, acute, nonlocalized;    TECHNIQUE:  The examination utilizes 100 cc of intravenous nonionic contrast material.    COMPARISON:  08/13/2023    FINDINGS:  The visualized lung bases are appropriately aerated.    The liver and spleen are normal in size and enhance homogeneously.  The gallbladder is surgically absent.  No biliary dilatation is observed.  No pancreatic or adrenal abnormality is demonstrated.    The kidneys are normal in size and position without suspicious mass, abnormal calcification or hydronephrosis.    The aorta tapers appropriately.    There is scattered colonic air-fluid levels as can be associated with diarrheal illness.  No bowel wall thickening is observed and there are no findings to suggest obstruction.  The appendix is unremarkable.    There is no adenopathy or significant free fluid.  There is a fat containing umbilical hernia.    The uterus appears unremarkable.  There is a 17 mm rim enhancing left ovarian mass which may represent an involuting follicle.  The urinary bladder is incompletely distended but grossly unremarkable.    No acute osseous abnormality is identified.                                        Medications   sodium chloride 0.9% bolus 1,000 mL 1,000 mL (0 mLs Intravenous Stopped 10/8/24 1339)   iohexoL (OMNIPAQUE 350) injection 100 mL (100 mLs Intravenous Given 10/8/24 1400)   potassium chloride SA CR tablet 40 mEq (40 mEq Oral Given 10/8/24 1457)   ondansetron injection 4 mg (4 mg Intravenous Given 10/8/24 1544)   cefTRIAXone (ROCEPHIN) 1 g in dextrose 5 % 100 mL IVPB (ready to mix) (0 g Intravenous Stopped 10/8/24 1620)     Medical Decision Making  Presents with complaint of nausea vomiting diarrhea.  Patient reports diarrhea for a week.  She vomited once last night.  She reports her diarrhea is liquid.  Patient was afebrile here.  She was seen by her primary care doctor on Wednesday and given Flagyl.  She has been taking it 3 times a day.  This has not improved the diarrhea.  She reports her pain in the abdomen a 4/10.    Amount and/or Complexity of Data Reviewed  Labs: ordered.     Details: Patient was labs are within normal limits with the exception of nitrites in her urine.  Her potassium is low at 3.2   Radiology: ordered.     Details: CT of the abdomen shows a mild distention of the colon with scattered colonic air-fluid levels associated with diarrhea illness.   Discussion of management or test interpretation with external provider(s): Patient was given the results of her CT abdomen.  She was instructed to stop eating.  She has been instructed to hydrate herself for the rest of the day since she was not eaten anything today.  She has been instructed to start with saltine crackers in the morning and to progress throughout the day with a continuing hydration and noodles.  This has what she can not eat for the rest of the day.  Then the following day on Thursday she should start with something soft like grits scrambled eggs baked chicken mashed potatoes.  She has been instructed as to the importance of following up the GI doctor as well as her gyn.  She has an 17  mm left ovarian cyst.  She was given Rocephin IV for her urinary tract infection.  She was given Zofran for home use for nausea and Ceftin 500 mg p.o. b.i.d. for 10 days.  Slight he has been given strict return precautions.  She verbalizes understanding.  At discharge is patient appeared to be in no acute distress.  She continues to report her pain at a 4/10.    Risk  Prescription drug management.                                      Clinical Impression:  Final diagnoses:  [R11.2] Nausea and vomiting, unspecified vomiting type (Primary)  [R19.7] Diarrhea, unspecified type  [N39.0] Urinary tract infection without hematuria, site unspecified  [N83.202] Cyst of left ovary          ED Disposition Condition    Discharge Stable          ED Prescriptions       Medication Sig Dispense Start Date End Date Auth. Provider    cefUROXime (CEFTIN) 500 MG tablet Take 1 tablet (500 mg total) by mouth every 12 (twelve) hours. for 10 days 20 tablet 10/8/2024 10/18/2024 Vidya Lieberman NP    ondansetron (ZOFRAN-ODT) 4 MG TbDL Take 1 tablet (4 mg total) by mouth every 8 (eight) hours as needed. 12 tablet 10/8/2024 -- Vidya Lieberman NP          Follow-up Information       Follow up With Specialties Details Why Contact Info    Jeansonne, Richard M. Jr., MD Family Medicine In 3 days  [address] [place] [address]  [phone]               Vidya Lieberman NP  10/08/24 0437

## 2024-10-08 NOTE — DISCHARGE INSTRUCTIONS
As we discussed clear liquids for the rest of the day.  This includes Gatorade Powerade popsicles Jell-O and Sprite.  Tomorrow you can start with saltine crackers and noodles as we discussed.  Continue to hydrate herself with all of the clear liquids.  On the 3rd day you can start with some soft foods chest grits scrambled baked chicken mashed potatoes.  I have placed an ambulatory referral to GI.  It was important that you keep that appointment.  Someone will call you with the time and a date within the next 48 hours.  You also have a left ovarian cyst.  Please follow up with your OBGYN.  Return to the ED for any worsening symptoms or any other concerns.

## 2024-10-29 LAB
ABO + RH BLD: NORMAL
ANTIBODY SCREEN: NEGATIVE
HBV SURFACE AG SERPL QL IA: NEGATIVE
HCV AB SERPL QL IA: NONREACTIVE
HIV 1+2 AB+HIV1 P24 AG SERPL QL IA: NONREACTIVE
RPR: NONREACTIVE
RUBELLA AB, IGG (OLG): 1.9 IU/ML
RUBELLA IMMUNE STATUS: NORMAL

## 2025-02-10 LAB
GLUCOSE SERPL-MCNC: 145 MG/DL
GLUCOSE, FASTING: 85 MG/DL
INDIRECT COOMBS: NEGATIVE

## 2025-02-12 DIAGNOSIS — Z67.11 TYPE A BLOOD, RH NEGATIVE: Primary | ICD-10-CM

## 2025-02-14 ENCOUNTER — INFUSION (OUTPATIENT)
Dept: INFUSION THERAPY | Facility: HOSPITAL | Age: 35
End: 2025-02-14
Attending: SPECIALIST
Payer: MEDICAID

## 2025-02-14 VITALS
DIASTOLIC BLOOD PRESSURE: 56 MMHG | OXYGEN SATURATION: 97 % | HEART RATE: 96 BPM | WEIGHT: 222.13 LBS | HEIGHT: 67 IN | BODY MASS INDEX: 34.87 KG/M2 | SYSTOLIC BLOOD PRESSURE: 114 MMHG | TEMPERATURE: 99 F | RESPIRATION RATE: 18 BRPM

## 2025-02-14 DIAGNOSIS — Z67.11 TYPE A BLOOD, RH NEGATIVE: Primary | ICD-10-CM

## 2025-02-14 LAB — INJECT RH IG VOL PATIENT: NORMAL ML

## 2025-02-14 PROCEDURE — 96372 THER/PROPH/DIAG INJ SC/IM: CPT

## 2025-02-14 PROCEDURE — 63600175 PHARM REV CODE 636 W HCPCS: Mod: JZ,TB | Performed by: SPECIALIST

## 2025-02-14 RX ADMIN — HUMAN RHO(D) IMMUNE GLOBULIN 1500 UNITS: 1500 INJECTION, SOLUTION INTRAMUSCULAR; INTRAVENOUS at 09:02

## 2025-04-18 ENCOUNTER — HOSPITAL ENCOUNTER (OUTPATIENT)
Facility: HOSPITAL | Age: 35
Discharge: HOME | End: 2025-04-18
Attending: SPECIALIST | Admitting: SPECIALIST
Payer: MEDICAID

## 2025-04-18 VITALS — RESPIRATION RATE: 17 BRPM | SYSTOLIC BLOOD PRESSURE: 133 MMHG | HEART RATE: 99 BPM | DIASTOLIC BLOOD PRESSURE: 66 MMHG

## 2025-04-18 DIAGNOSIS — O42.90 AMNIOTIC FLUID LEAKING: ICD-10-CM

## 2025-04-18 LAB
CTP QC/QA: YES
RUPTURE OF MEMBRANE: NEGATIVE

## 2025-04-18 PROCEDURE — 99211 OFF/OP EST MAY X REQ PHY/QHP: CPT

## 2025-04-18 NOTE — DISCHARGE INSTRUCTIONS
Keep your scheduled appointment with your provider.    Call your Doctor if you have any of the following:  Temperature above 100 degrees  Nausea, vomiting and/or diarrhea  Severe headache, dizziness, or blurred vision  Notable increase in swelling of hands or feet  Notable swelling of face and lips  Difficulty, pain or burning with urination  Foul smelling vaginal discharge  Decreased fetal movement    Come to the hospital if you have any of the following symptoms:  Your water breaks  More than 4-6 contractions in 1 hour for 2 or more hours  Vaginal bleeding like a period    After 28 weeks, you should feel 10 distinct fetal movements within a 2 hour period.    It is recommended that you drink 1/2 a gallon of water each day.  Tea, Soda and Juice are  in addition to this.    Labor and Delivery Phone number: [phone]    If you need to be seen on Labor and delivery between the hours of 6pm and 7am, please enter through the Emergency room entrance.

## 2025-04-18 NOTE — NURSING
[facility]  Department of Obstetrics and Gynecology  Labor & Delivery Triage Assessment    PATIENT NAME: Kristina Blum  MRN: [id number]  TODAY'S DATE: 2025    CHIEF COMPLAINT: Vag leaking      OB History    Para Term  AB Living   3 0 0 0 0 2   SAB IAB Ectopic Multiple Live Births   0 0 0 0 2      # Outcome Date GA Lbr Benito/2nd Weight Sex Type Anes PTL Lv   3 Current            2          ERNA   1          ERNA     Past Medical History:   Diagnosis Date    Allergy     Arthritis     Asthma      Past Surgical History:   Procedure Laterality Date    FRACTURE SURGERY      LAPAROSCOPIC CHOLECYSTECTOMY N/A 2023    Procedure: CHOLECYSTECTOMY, LAPAROSCOPIC;  Surgeon: Alex Mckeon Jr., MD;  Location: [facility];  Service: General;  Laterality: N/A;         VITAL SIGNS - ABNORMAL VITALS INCLUDE TEMP >100.4,RR <12 or >26, SUSTAINED MATERNAL PULSE <60 or >120     VITAL SIGNS (Most Recent)  Pulse: 99 (25)  Resp: 17 (25)  BP: 133/66 (25)    VITAL SIGNS     normal  HEADACHE    no     VOMITING    no  VISUAL DISTURBANCES  no  EPIGASTRIC PAIN        no  PROTEINURIA 2+ or MORE             no   EDEMA FACE/EXTREMITIES            no    FETAL MOVEMENT     FETAL MOVEMENT: Active  FETAL HEART RATE BASELINE =  140  normal  FETAL HEART RATE VARIABILITY:  Moderate  FETAL HEART RATE ACCELERATIONS FOR GESTATIONAL AGE: present  FETAL HEART RATE DECELERATIONS: none    ABDOMINAL PAIN/CRAMPING/CONTRACTIONS     Patient is complaining of abdominal pain/cramping/contractions. For  patients  2  contractions/hour. Contraction strength is mild. Resting tone is relaxed. Abdominal palpation is non-tender.    RUPTURE OF MEMBRANES OR LEAKING OF AMNIOTIC FLUID     Patient reports leaking of amniotic fluid and is clear in color and reporting amount as small. N/A ROM plus collected is Negative. GBS status is Unknown.    VAGINAL BLEEDING     Patient denies vaginal  bleeding.    VAGINAL EXAM       VAGINAL EXAM DEFERRED DUE TO:  Not in Labor    PAIN PRESENT ON ARRIVAL       Interventions     None.      PATIENT DISPOSITION     To be determined       Dr. Parmar  notified at 1820 of the above assessment.    Katharine Mcgowan RN  [facility]  04/18/2025

## 2025-05-14 DIAGNOSIS — Z34.90 ENCOUNTER FOR INDUCTION OF LABOR: Primary | ICD-10-CM

## 2025-05-14 LAB — PRENATAL STREP B CULTURE: NEGATIVE

## 2025-05-26 ENCOUNTER — HOSPITAL ENCOUNTER (OUTPATIENT)
Facility: HOSPITAL | Age: 35
Discharge: HOME | End: 2025-05-26
Attending: SPECIALIST | Admitting: SPECIALIST
Payer: MEDICAID

## 2025-05-26 VITALS — HEART RATE: 103 BPM | RESPIRATION RATE: 18 BRPM | SYSTOLIC BLOOD PRESSURE: 123 MMHG | DIASTOLIC BLOOD PRESSURE: 70 MMHG

## 2025-05-26 DIAGNOSIS — Z34.90 PREGNANCY: ICD-10-CM

## 2025-05-26 PROCEDURE — 99211 OFF/OP EST MAY X REQ PHY/QHP: CPT

## 2025-06-11 NOTE — NURSING
OBGYN LABS ENTERED INTO RESULTS CONSOLE      1st Trimester Labs Entered Into Results Console     [x] AOBRH   [x] Rubella Immune   [x] RPR   [x] HBsAg   [x] HIV   [] Chlamydia   [] Gonorrhea   [] Cell-Free DNA   [x] Hep-C   [] Varicella    2nd Trimester Labs Entered Into Results Console     [x]OB Glucose Screen      3rd Trimester Labs Entered Into Results Console      [x] GBS   [] RPR    Drug Screen Entered Into Results Console     [] Benzodiazes   [] Methadone   [] Cocaine (Metab)   [] Opiate   [] Amphetamine   [] Marijuana   [] Creatinine   [] Amphetamines-Beaker   [] Barbituates-Beaker   [] Benzodiazepine-Beaker   [] Cannabinoids-Beaker   [] Cocaine-Beaker   [] Fentanyl-Beaker   [] MDMA-Beaker   [] Opiates-Beaker   [] Phencyclidine-Beaker        Results Entered by Jesus Boyd, RN    Results Verified for Manual Entry Accuracy by Katharine Turpin RN

## 2025-06-12 ENCOUNTER — ANESTHESIA EVENT (OUTPATIENT)
Dept: OBSTETRICS AND GYNECOLOGY | Facility: HOSPITAL | Age: 35
End: 2025-06-12
Payer: MEDICAID

## 2025-06-12 ENCOUNTER — HOSPITAL ENCOUNTER (INPATIENT)
Facility: HOSPITAL | Age: 35
LOS: 2 days | Discharge: HOME | End: 2025-06-14
Attending: SPECIALIST | Admitting: SPECIALIST
Payer: MEDICAID

## 2025-06-12 ENCOUNTER — ANESTHESIA (OUTPATIENT)
Dept: OBSTETRICS AND GYNECOLOGY | Facility: HOSPITAL | Age: 35
End: 2025-06-12
Payer: MEDICAID

## 2025-06-12 DIAGNOSIS — Z67.11 TYPE A BLOOD, RH NEGATIVE: ICD-10-CM

## 2025-06-12 DIAGNOSIS — Z34.90 ENCOUNTER FOR ELECTIVE INDUCTION OF LABOR: Primary | ICD-10-CM

## 2025-06-12 DIAGNOSIS — Z90.49 S/P LAPAROSCOPIC CHOLECYSTECTOMY: ICD-10-CM

## 2025-06-12 DIAGNOSIS — K80.00 CALCULUS OF GALLBLADDER WITH ACUTE CHOLECYSTITIS WITHOUT OBSTRUCTION: ICD-10-CM

## 2025-06-12 DIAGNOSIS — R11.2 NAUSEA AND VOMITING: ICD-10-CM

## 2025-06-12 DIAGNOSIS — Z34.90 PREGNANCY: ICD-10-CM

## 2025-06-12 DIAGNOSIS — F41.1 GAD (GENERALIZED ANXIETY DISORDER): ICD-10-CM

## 2025-06-12 DIAGNOSIS — N39.0 URINARY TRACT INFECTION WITHOUT HEMATURIA: ICD-10-CM

## 2025-06-12 DIAGNOSIS — F90.9 ATTENTION DEFICIT HYPERACTIVITY DISORDER (ADHD): ICD-10-CM

## 2025-06-12 DIAGNOSIS — Z30.9 ENCOUNTER FOR CONTRACEPTIVE MANAGEMENT: ICD-10-CM

## 2025-06-12 DIAGNOSIS — N83.202 CYST OF LEFT OVARY: ICD-10-CM

## 2025-06-12 DIAGNOSIS — R19.7 DIARRHEA: ICD-10-CM

## 2025-06-12 DIAGNOSIS — Z34.90 ENCOUNTER FOR INDUCTION OF LABOR: ICD-10-CM

## 2025-06-12 DIAGNOSIS — K21.9 GASTROESOPHAGEAL REFLUX DISEASE: ICD-10-CM

## 2025-06-12 DIAGNOSIS — J04.0 LARYNGITIS: ICD-10-CM

## 2025-06-12 LAB
ABORH RETYPE: NORMAL
ABSOLUTE EOSINOPHIL (SMH): 0.06 K/UL
ABSOLUTE MONOCYTE (SMH): 0.68 K/UL (ref 0.3–1)
ABSOLUTE NEUTROPHIL COUNT (SMH): 7.4 K/UL (ref 1.8–7.7)
AMPHET UR QL SCN: NEGATIVE
BARBITURATE SCN PRESENT UR: NEGATIVE
BASOPHILS # BLD AUTO: 0.03 K/UL
BASOPHILS NFR BLD AUTO: 0.3 %
BENZODIAZ UR QL SCN: NEGATIVE
BILIRUB UR QL STRIP.AUTO: NEGATIVE
BUPRENORPHINE UR QL SCN: NEGATIVE
CANNABINOIDS UR QL SCN: NEGATIVE
CLARITY UR: CLEAR
COCAINE UR QL SCN: NEGATIVE
COLOR UR AUTO: YELLOW
CREAT UR-MCNC: 83.1 MG/DL (ref 15–325)
ERYTHROCYTE [DISTWIDTH] IN BLOOD BY AUTOMATED COUNT: 13.9 % (ref 11.5–14.5)
FENTANYL UR QL SCN: NEGATIVE
GLUCOSE UR QL STRIP: NEGATIVE
GROUP & RH: NORMAL
HCT VFR BLD AUTO: 33.9 % (ref 37–48.5)
HGB BLD-MCNC: 11.5 GM/DL (ref 12–16)
HGB UR QL STRIP: NEGATIVE
IMM GRANULOCYTES # BLD AUTO: 0.1 K/UL (ref 0–0.04)
IMM GRANULOCYTES NFR BLD AUTO: 1 % (ref 0–0.5)
INDIRECT COOMBS: NORMAL
INDIRECT COOMBS: NORMAL
KETONES UR QL STRIP: NEGATIVE
LEUKOCYTE ESTERASE UR QL STRIP: NEGATIVE
LYMPHOCYTES # BLD AUTO: 1.54 K/UL (ref 1–4.8)
MCH RBC QN AUTO: 30.3 PG (ref 27–31)
MCHC RBC AUTO-ENTMCNC: 33.9 G/DL (ref 32–36)
MCV RBC AUTO: 89 FL (ref 82–98)
NITRITE UR QL STRIP: NEGATIVE
NUCLEATED RBC (/100WBC) (SMH): 0 /100 WBC
OPIATES UR QL SCN: NEGATIVE
PCP UR QL: NEGATIVE
PH UR STRIP: 6 [PH]
PLATELET # BLD AUTO: 175 K/UL (ref 150–450)
PMV BLD AUTO: 11.9 FL (ref 9.2–12.9)
PROT UR QL STRIP: NEGATIVE
RBC # BLD AUTO: 3.79 M/UL (ref 4–5.4)
RELATIVE EOSINOPHIL (SMH): 0.6 % (ref 0–8)
RELATIVE LYMPHOCYTE (SMH): 15.6 % (ref 18–48)
RELATIVE MONOCYTE (SMH): 6.9 % (ref 4–15)
RELATIVE NEUTROPHIL (SMH): 75.6 % (ref 38–73)
RH BLD: NORMAL
RH IMMUNE GLOBULIN: NORMAL
ROSETTE - FMH (FETAL BLEED SCREEN): NORMAL
SP GR UR STRIP: 1.02
T PALLIDUM IGG+IGM SER QL: NORMAL
UROBILINOGEN UR STRIP-ACNC: NEGATIVE EU/DL
WBC # BLD AUTO: 9.85 K/UL (ref 3.9–12.7)

## 2025-06-12 PROCEDURE — 63600519 RHOGAM PHARM REV CODE 636 ALT 250 W HCPCS: Performed by: SPECIALIST

## 2025-06-12 PROCEDURE — 27200710 HC EPIDURAL INFUSION PUMP SET: Performed by: ANESTHESIOLOGY

## 2025-06-12 PROCEDURE — 86850 RBC ANTIBODY SCREEN: CPT | Performed by: SPECIALIST

## 2025-06-12 PROCEDURE — 85025 COMPLETE CBC W/AUTO DIFF WBC: CPT | Performed by: SPECIALIST

## 2025-06-12 PROCEDURE — 86593 SYPHILIS TEST NON-TREP QUANT: CPT | Performed by: SPECIALIST

## 2025-06-12 PROCEDURE — 63600175 PHARM REV CODE 636 W HCPCS: Performed by: SPECIALIST

## 2025-06-12 PROCEDURE — 80307 DRUG TEST PRSMV CHEM ANLYZR: CPT | Performed by: SPECIALIST

## 2025-06-12 PROCEDURE — 11000001 HC ACUTE MED/SURG PRIVATE ROOM

## 2025-06-12 PROCEDURE — 25000003 PHARM REV CODE 250: Performed by: SPECIALIST

## 2025-06-12 PROCEDURE — 86901 BLOOD TYPING SEROLOGIC RH(D): CPT | Mod: 91 | Performed by: SPECIALIST

## 2025-06-12 PROCEDURE — 85461 HEMOGLOBIN FETAL: CPT | Performed by: SPECIALIST

## 2025-06-12 PROCEDURE — 62326 NJX INTERLAMINAR LMBR/SAC: CPT | Performed by: ANESTHESIOLOGY

## 2025-06-12 PROCEDURE — 81003 URINALYSIS AUTO W/O SCOPE: CPT | Performed by: SPECIALIST

## 2025-06-12 PROCEDURE — 3E0234Z INTRODUCTION OF SERUM, TOXOID AND VACCINE INTO MUSCLE, PERCUTANEOUS APPROACH: ICD-10-PCS | Performed by: GENERAL PRACTICE

## 2025-06-12 PROCEDURE — 3E033VJ INTRODUCTION OF OTHER HORMONE INTO PERIPHERAL VEIN, PERCUTANEOUS APPROACH: ICD-10-PCS | Performed by: SPECIALIST

## 2025-06-12 PROCEDURE — C1751 CATH, INF, PER/CENT/MIDLINE: HCPCS | Performed by: ANESTHESIOLOGY

## 2025-06-12 PROCEDURE — 36415 COLL VENOUS BLD VENIPUNCTURE: CPT | Performed by: SPECIALIST

## 2025-06-12 PROCEDURE — 72200005 HC VAGINAL DELIVERY LEVEL II

## 2025-06-12 PROCEDURE — 72100002 HC LABOR CARE, 1ST 8 HOURS

## 2025-06-12 PROCEDURE — 51702 INSERT TEMP BLADDER CATH: CPT

## 2025-06-12 PROCEDURE — 10907ZC DRAINAGE OF AMNIOTIC FLUID, THERAPEUTIC FROM PRODUCTS OF CONCEPTION, VIA NATURAL OR ARTIFICIAL OPENING: ICD-10-PCS | Performed by: SPECIALIST

## 2025-06-12 RX ORDER — MISOPROSTOL 200 UG/1
800 TABLET ORAL ONCE AS NEEDED
Status: DISCONTINUED | OUTPATIENT
Start: 2025-06-12 | End: 2025-06-14 | Stop reason: HOSPADM

## 2025-06-12 RX ORDER — CARBOPROST TROMETHAMINE 250 UG/ML
250 INJECTION, SOLUTION INTRAMUSCULAR
Status: DISCONTINUED | OUTPATIENT
Start: 2025-06-12 | End: 2025-06-12

## 2025-06-12 RX ORDER — CARBOPROST TROMETHAMINE 250 UG/ML
250 INJECTION, SOLUTION INTRAMUSCULAR
Status: DISCONTINUED | OUTPATIENT
Start: 2025-06-12 | End: 2025-06-14 | Stop reason: HOSPADM

## 2025-06-12 RX ORDER — MISOPROSTOL 200 UG/1
800 TABLET ORAL ONCE AS NEEDED
Status: CANCELLED | OUTPATIENT
Start: 2025-06-12 | End: 2036-11-08

## 2025-06-12 RX ORDER — METHYLERGONOVINE MALEATE 0.2 MG/ML
200 INJECTION INTRAVENOUS ONCE AS NEEDED
Status: DISCONTINUED | OUTPATIENT
Start: 2025-06-12 | End: 2025-06-12

## 2025-06-12 RX ORDER — TRANEXAMIC ACID 10 MG/ML
1000 INJECTION, SOLUTION INTRAVENOUS EVERY 30 MIN PRN
Status: DISCONTINUED | OUTPATIENT
Start: 2025-06-12 | End: 2025-06-12

## 2025-06-12 RX ORDER — METHYLERGONOVINE MALEATE 0.2 MG/ML
200 INJECTION INTRAVENOUS ONCE AS NEEDED
Status: CANCELLED | OUTPATIENT
Start: 2025-06-12 | End: 2036-11-08

## 2025-06-12 RX ORDER — EPHEDRINE SULFATE 50 MG/ML
10 INJECTION, SOLUTION INTRAVENOUS ONCE
Status: DISCONTINUED | OUTPATIENT
Start: 2025-06-12 | End: 2025-06-12

## 2025-06-12 RX ORDER — MISOPROSTOL 200 UG/1
800 TABLET ORAL ONCE AS NEEDED
Status: DISCONTINUED | OUTPATIENT
Start: 2025-06-12 | End: 2025-06-12

## 2025-06-12 RX ORDER — METHYLERGONOVINE MALEATE 0.2 MG/ML
200 INJECTION INTRAVENOUS ONCE AS NEEDED
Status: DISCONTINUED | OUTPATIENT
Start: 2025-06-12 | End: 2025-06-14 | Stop reason: HOSPADM

## 2025-06-12 RX ORDER — OXYTOCIN-SODIUM CHLORIDE 0.9% IV SOLN 30 UNIT/500ML 30-0.9/5 UT/ML-%
95 SOLUTION INTRAVENOUS CONTINUOUS PRN
Status: DISCONTINUED | OUTPATIENT
Start: 2025-06-12 | End: 2025-06-12

## 2025-06-12 RX ORDER — SODIUM CHLORIDE 0.9 % (FLUSH) 0.9 %
3 SYRINGE (ML) INJECTION EVERY 8 HOURS
Status: DISCONTINUED | OUTPATIENT
Start: 2025-06-12 | End: 2025-06-12

## 2025-06-12 RX ORDER — NALOXONE HCL 0.4 MG/ML
0.4 VIAL (ML) INJECTION SEE ADMIN INSTRUCTIONS
Status: DISCONTINUED | OUTPATIENT
Start: 2025-06-12 | End: 2025-06-12

## 2025-06-12 RX ORDER — OXYCODONE AND ACETAMINOPHEN 5; 325 MG/1; MG/1
1 TABLET ORAL EVERY 4 HOURS PRN
Status: DISCONTINUED | OUTPATIENT
Start: 2025-06-12 | End: 2025-06-14 | Stop reason: HOSPADM

## 2025-06-12 RX ORDER — AMOXICILLIN 250 MG
1 CAPSULE ORAL 2 TIMES DAILY PRN
Status: DISCONTINUED | OUTPATIENT
Start: 2025-06-12 | End: 2025-06-14 | Stop reason: HOSPADM

## 2025-06-12 RX ORDER — OXYTOCIN-SODIUM CHLORIDE 0.9% IV SOLN 30 UNIT/500ML 30-0.9/5 UT/ML-%
30 SOLUTION INTRAVENOUS ONCE AS NEEDED
Status: DISCONTINUED | OUTPATIENT
Start: 2025-06-12 | End: 2025-06-14 | Stop reason: HOSPADM

## 2025-06-12 RX ORDER — CARBOPROST TROMETHAMINE 250 UG/ML
250 INJECTION, SOLUTION INTRAMUSCULAR
Status: CANCELLED | OUTPATIENT
Start: 2025-06-12

## 2025-06-12 RX ORDER — BUTORPHANOL TARTRATE 2 MG/ML
1 INJECTION INTRAMUSCULAR; INTRAVENOUS EVERY 4 HOURS PRN
Status: DISCONTINUED | OUTPATIENT
Start: 2025-06-12 | End: 2025-06-12

## 2025-06-12 RX ORDER — PRENATAL WITH FERROUS FUM AND FOLIC ACID 3080; 920; 120; 400; 22; 1.84; 3; 20; 10; 1; 12; 200; 27; 25; 2 [IU]/1; [IU]/1; MG/1; [IU]/1; MG/1; MG/1; MG/1; MG/1; MG/1; MG/1; UG/1; MG/1; MG/1; MG/1; MG/1
1 TABLET ORAL DAILY
Status: DISCONTINUED | OUTPATIENT
Start: 2025-06-12 | End: 2025-06-14 | Stop reason: HOSPADM

## 2025-06-12 RX ORDER — ONDANSETRON 4 MG/1
8 TABLET, ORALLY DISINTEGRATING ORAL EVERY 8 HOURS PRN
Status: DISCONTINUED | OUTPATIENT
Start: 2025-06-12 | End: 2025-06-14 | Stop reason: HOSPADM

## 2025-06-12 RX ORDER — OXYTOCIN 10 [USP'U]/ML
10 INJECTION, SOLUTION INTRAMUSCULAR; INTRAVENOUS ONCE AS NEEDED
Status: DISCONTINUED | OUTPATIENT
Start: 2025-06-12 | End: 2025-06-12

## 2025-06-12 RX ORDER — OXYTOCIN-SODIUM CHLORIDE 0.9% IV SOLN 30 UNIT/500ML 30-0.9/5 UT/ML-%
95 SOLUTION INTRAVENOUS CONTINUOUS PRN
Status: DISCONTINUED | OUTPATIENT
Start: 2025-06-12 | End: 2025-06-14 | Stop reason: HOSPADM

## 2025-06-12 RX ORDER — DIPHENOXYLATE HYDROCHLORIDE AND ATROPINE SULFATE 2.5; .025 MG/1; MG/1
2 TABLET ORAL EVERY 6 HOURS PRN
Status: DISCONTINUED | OUTPATIENT
Start: 2025-06-12 | End: 2025-06-12

## 2025-06-12 RX ORDER — ONDANSETRON HYDROCHLORIDE 2 MG/ML
4 INJECTION, SOLUTION INTRAVENOUS EVERY 6 HOURS PRN
Status: DISCONTINUED | OUTPATIENT
Start: 2025-06-12 | End: 2025-06-12

## 2025-06-12 RX ORDER — HYDROCORTISONE 25 MG/G
CREAM TOPICAL 3 TIMES DAILY PRN
Status: DISCONTINUED | OUTPATIENT
Start: 2025-06-12 | End: 2025-06-14 | Stop reason: HOSPADM

## 2025-06-12 RX ORDER — TRANEXAMIC ACID 10 MG/ML
1000 INJECTION, SOLUTION INTRAVENOUS EVERY 30 MIN PRN
Status: CANCELLED | OUTPATIENT
Start: 2025-06-12

## 2025-06-12 RX ORDER — DIPHENOXYLATE HYDROCHLORIDE AND ATROPINE SULFATE 2.5; .025 MG/1; MG/1
2 TABLET ORAL EVERY 6 HOURS PRN
Status: CANCELLED | OUTPATIENT
Start: 2025-06-12

## 2025-06-12 RX ORDER — TRANEXAMIC ACID 10 MG/ML
1000 INJECTION, SOLUTION INTRAVENOUS EVERY 30 MIN PRN
Status: DISCONTINUED | OUTPATIENT
Start: 2025-06-12 | End: 2025-06-14 | Stop reason: HOSPADM

## 2025-06-12 RX ORDER — OXYCODONE AND ACETAMINOPHEN 10; 325 MG/1; MG/1
1 TABLET ORAL EVERY 4 HOURS PRN
Status: DISCONTINUED | OUTPATIENT
Start: 2025-06-12 | End: 2025-06-14 | Stop reason: HOSPADM

## 2025-06-12 RX ORDER — OXYTOCIN-SODIUM CHLORIDE 0.9% IV SOLN 30 UNIT/500ML 30-0.9/5 UT/ML-%
95 SOLUTION INTRAVENOUS ONCE AS NEEDED
Status: CANCELLED | OUTPATIENT
Start: 2025-06-12 | End: 2036-11-08

## 2025-06-12 RX ORDER — ACETAMINOPHEN 325 MG/1
650 TABLET ORAL EVERY 6 HOURS SCHEDULED
Status: DISCONTINUED | OUTPATIENT
Start: 2025-06-12 | End: 2025-06-12

## 2025-06-12 RX ORDER — OXYTOCIN-SODIUM CHLORIDE 0.9% IV SOLN 30 UNIT/500ML 30-0.9/5 UT/ML-%
10 SOLUTION INTRAVENOUS ONCE AS NEEDED
Status: DISCONTINUED | OUTPATIENT
Start: 2025-06-12 | End: 2025-06-12

## 2025-06-12 RX ORDER — SIMETHICONE 80 MG
1 TABLET,CHEWABLE ORAL EVERY 6 HOURS PRN
Status: DISCONTINUED | OUTPATIENT
Start: 2025-06-12 | End: 2025-06-14 | Stop reason: HOSPADM

## 2025-06-12 RX ORDER — OXYTOCIN-SODIUM CHLORIDE 0.9% IV SOLN 30 UNIT/500ML 30-0.9/5 UT/ML-%
0-30 SOLUTION INTRAVENOUS CONTINUOUS
Status: DISCONTINUED | OUTPATIENT
Start: 2025-06-12 | End: 2025-06-12

## 2025-06-12 RX ORDER — DIPHENOXYLATE HYDROCHLORIDE AND ATROPINE SULFATE 2.5; .025 MG/1; MG/1
2 TABLET ORAL EVERY 6 HOURS PRN
Status: DISCONTINUED | OUTPATIENT
Start: 2025-06-12 | End: 2025-06-14 | Stop reason: HOSPADM

## 2025-06-12 RX ORDER — DOCUSATE SODIUM 100 MG/1
200 CAPSULE, LIQUID FILLED ORAL 2 TIMES DAILY PRN
Status: DISCONTINUED | OUTPATIENT
Start: 2025-06-12 | End: 2025-06-14 | Stop reason: HOSPADM

## 2025-06-12 RX ORDER — OXYTOCIN 10 [USP'U]/ML
10 INJECTION, SOLUTION INTRAMUSCULAR; INTRAVENOUS ONCE AS NEEDED
Status: DISCONTINUED | OUTPATIENT
Start: 2025-06-12 | End: 2025-06-14 | Stop reason: HOSPADM

## 2025-06-12 RX ORDER — DIPHENHYDRAMINE HYDROCHLORIDE 50 MG/ML
12.5 INJECTION, SOLUTION INTRAMUSCULAR; INTRAVENOUS EVERY 4 HOURS PRN
Status: DISCONTINUED | OUTPATIENT
Start: 2025-06-12 | End: 2025-06-12

## 2025-06-12 RX ORDER — OXYTOCIN 10 [USP'U]/ML
10 INJECTION, SOLUTION INTRAMUSCULAR; INTRAVENOUS ONCE AS NEEDED
Status: CANCELLED | OUTPATIENT
Start: 2025-06-12 | End: 2036-11-08

## 2025-06-12 RX ORDER — ACETAMINOPHEN 325 MG/1
650 TABLET ORAL EVERY 6 HOURS PRN
Status: DISCONTINUED | OUTPATIENT
Start: 2025-06-12 | End: 2025-06-14 | Stop reason: HOSPADM

## 2025-06-12 RX ORDER — FENTANYL/BUPIVACAINE/NS/PF 2MCG/ML-.1
PLASTIC BAG, INJECTION (ML) INJECTION CONTINUOUS
Refills: 0 | Status: DISCONTINUED | OUTPATIENT
Start: 2025-06-12 | End: 2025-06-12

## 2025-06-12 RX ORDER — CALCIUM CARBONATE 200(500)MG
500 TABLET,CHEWABLE ORAL 3 TIMES DAILY PRN
Status: DISCONTINUED | OUTPATIENT
Start: 2025-06-12 | End: 2025-06-12

## 2025-06-12 RX ORDER — OXYTOCIN-SODIUM CHLORIDE 0.9% IV SOLN 30 UNIT/500ML 30-0.9/5 UT/ML-%
95 SOLUTION INTRAVENOUS ONCE AS NEEDED
Status: DISCONTINUED | OUTPATIENT
Start: 2025-06-12 | End: 2025-06-12

## 2025-06-12 RX ORDER — SODIUM CHLORIDE, SODIUM LACTATE, POTASSIUM CHLORIDE, CALCIUM CHLORIDE 600; 310; 30; 20 MG/100ML; MG/100ML; MG/100ML; MG/100ML
INJECTION, SOLUTION INTRAVENOUS CONTINUOUS
Status: DISCONTINUED | OUTPATIENT
Start: 2025-06-12 | End: 2025-06-12

## 2025-06-12 RX ORDER — FENTANYL/BUPIVACAINE/NS/PF 2MCG/ML-.1
14 PLASTIC BAG, INJECTION (ML) INJECTION CONTINUOUS
Refills: 0 | Status: DISCONTINUED | OUTPATIENT
Start: 2025-06-12 | End: 2025-06-12

## 2025-06-12 RX ORDER — OXYTOCIN-SODIUM CHLORIDE 0.9% IV SOLN 30 UNIT/500ML 30-0.9/5 UT/ML-%
SOLUTION INTRAVENOUS ONCE AS NEEDED
Status: CANCELLED | OUTPATIENT
Start: 2025-06-12 | End: 2036-11-08

## 2025-06-12 RX ORDER — ROPIVACAINE HYDROCHLORIDE 2 MG/ML
20 INJECTION, SOLUTION EPIDURAL; INFILTRATION ONCE AS NEEDED
Status: COMPLETED | OUTPATIENT
Start: 2025-06-12 | End: 2025-06-12

## 2025-06-12 RX ORDER — OXYTOCIN-SODIUM CHLORIDE 0.9% IV SOLN 30 UNIT/500ML 30-0.9/5 UT/ML-%
95 SOLUTION INTRAVENOUS ONCE AS NEEDED
Status: DISCONTINUED | OUTPATIENT
Start: 2025-06-12 | End: 2025-06-14 | Stop reason: HOSPADM

## 2025-06-12 RX ORDER — EPHEDRINE SULFATE 50 MG/ML
10 INJECTION, SOLUTION INTRAVENOUS ONCE AS NEEDED
Status: DISCONTINUED | OUTPATIENT
Start: 2025-06-12 | End: 2025-06-12

## 2025-06-12 RX ADMIN — IBUPROFEN 600 MG: 400 TABLET ORAL at 08:06

## 2025-06-12 RX ADMIN — OXYTOCIN-SODIUM CHLORIDE 0.9% IV SOLN 30 UNIT/500ML 2 MILLI-UNITS/MIN: 30-0.9/5 SOLUTION at 12:06

## 2025-06-12 RX ADMIN — HUMAN RHO(D) IMMUNE GLOBULIN 300 MCG: 1500 SOLUTION INTRAMUSCULAR; INTRAVENOUS at 04:06

## 2025-06-12 RX ADMIN — ROPIVACAINE HYDROCHLORIDE 5 ML: 2 INJECTION EPIDURAL; INFILTRATION; PERINEURAL at 08:06

## 2025-06-12 RX ADMIN — SODIUM CHLORIDE, POTASSIUM CHLORIDE, SODIUM LACTATE AND CALCIUM CHLORIDE 1000 ML: 600; 310; 30; 20 INJECTION, SOLUTION INTRAVENOUS at 08:06

## 2025-06-12 RX ADMIN — OXYTOCIN-SODIUM CHLORIDE 0.9% IV SOLN 30 UNIT/500ML 10 UNITS: 30-0.9/5 SOLUTION at 12:06

## 2025-06-12 RX ADMIN — SODIUM CHLORIDE, POTASSIUM CHLORIDE, SODIUM LACTATE AND CALCIUM CHLORIDE: 600; 310; 30; 20 INJECTION, SOLUTION INTRAVENOUS at 12:06

## 2025-06-12 RX ADMIN — Medication 14 ML/HR: at 08:06

## 2025-06-12 NOTE — HOSPITAL COURSE
34-year-old  3 para 2 at 39 weeks and 1 day gestational age admitted for induction of labor.  Patient underwent spontaneous vaginal delivery, please see delivery note for details.

## 2025-06-12 NOTE — PLAN OF CARE
Problem: Labor  Goal: Hemostasis  Outcome: Met  Goal: Stable Fetal Wellbeing  Outcome: Met  Goal: Effective Progression to Delivery  Outcome: Met  Goal: Absence of Infection Signs and Symptoms  Outcome: Met  Goal: Acceptable Pain Control  Outcome: Met  Goal: Normal Uterine Contraction Pattern  Outcome: Met   Good delivery at 1155, of a healthy baby girl.

## 2025-06-12 NOTE — PLAN OF CARE
Problem: Adult Inpatient Plan of Care  Goal: Plan of Care Review  Outcome: Progressing  Goal: Patient-Specific Goal (Individualized)  Outcome: Progressing  Goal: Optimal Comfort and Wellbeing  Outcome: Progressing     Problem: Labor  Goal: Stable Fetal Wellbeing  Outcome: Progressing  Goal: Acceptable Pain Control  Outcome: Progressing

## 2025-06-12 NOTE — SUBJECTIVE & OBJECTIVE
Interval History:  Kristina is a 34 y.o.  at 39w1d. She is doing well.  Feeling mild contractions, active fetal movement    Objective:     Vital Signs (Most Recent):  Temp: 97.8 °F (36.6 °C) (25 0701)  Pulse: 100 (25 0701)  Resp: 18 (25 0701)  BP: 127/86 (25 0701)  SpO2: 96 % (25 0030) Vital Signs (24h Range):  Temp:  [97.8 °F (36.6 °C)] 97.8 °F (36.6 °C)  Pulse:  [] 100  Resp:  [16-18] 18  SpO2:  [96 %] 96 %  BP: (122-143)/(58-87) 127/86     Weight: 109.8 kg (242 lb)  Body mass index is 37.9 kg/m².    FHT:  Baseline 140s with good variability   TOCO:  Q 2-3 minutes    Cervical Exam:  Dilation:  3.5 cm  Effacement:  75%  Station: -3  Presentation: Vertex     Significant Labs:  Lab Results   Component Value Date    GROUPTRH A NEG 2025    HEPBSAG Negative 10/29/2024    STREPBCULT Negative 2025       CBC:   Recent Labs   Lab 25  0025   WBC 9.85   RBC 3.79*   HGB 11.5*   HCT 33.9*      MCV 89   MCH 30.3   MCHC 33.9     I have personallly reviewed all pertinent lab results from the last 24 hours.    Physical Exam  General-no distress resting comfortably in bed   Abdomen/uterus gravid nontender   Extremities-no calf tenderness  Review of Systems

## 2025-06-12 NOTE — ANESTHESIA PROCEDURE NOTES
Epidural    Patient location during procedure: OB   Reason for block: primary anesthetic   Reason for block: labor analgesia requested by patient and obstetrician  Diagnosis: IUP   Start time: 6/12/2025 8:40 AM  Timeout: 6/12/2025 8:40 AM  End time: 6/12/2025 8:55 AM    Staffing  Performing Provider: Gilberto Vasquez MD  Authorizing Provider: Gilberto Vasquez MD    Staffing  Performed by: Gilberto Vasquez MD  Authorized by: Gilberto Vasquez MD        Preanesthetic Checklist  Completed: patient identified, IV checked, site marked, risks and benefits discussed, surgical consent, monitors and equipment checked, pre-op evaluation, timeout performed, anesthesia consent given, hand hygiene performed and patient being monitored  Preparation  Patient position: sitting  Prep: Betadine  Patient monitoring: ECG and Blood Pressure  Reason for block: primary anesthetic   Epidural  Skin Anesthetic: lidocaine 1%  Skin Wheal: 3 mL  Administration type: continuous  Approach: midline  Interspace: L2-3    Injection technique: AVIVA air  Needle and Epidural Catheter  Needle type: Tuohy   Needle gauge: 17  Needle length: 3.5 inches  Needle insertion depth: 6.5 cm  Catheter type: springwound and multi-orifice  Catheter size: 19 G  Catheter at skin depth: 11 cm  Insertion Attempts: 1  Test dose: 5 mL of lidocaine 1.5% with Epi 1-to-200,000  Additional Documentation: incremental injection, no paresthesia on injection, no significant pain on injection, negative aspiration for heme and CSF, no signs/symptoms of IV or SA injection and no significant complaints from patient  Needle localization: anatomical landmarks  Assessment  Ease of block: moderate  Patient's tolerance of the procedure: comfortable throughout block and no complaints  Additional Notes  Patient has a midline lump over her L3-L5 area, which has been present for at least a year.  She believes this is her herniated disc.  I told her that is not possible.  It is not tender to  palpation.  I placed my epidural above this area. No inadvertent dural puncture with Tuohy.  Dural puncture not performed with spinal needle

## 2025-06-12 NOTE — PLAN OF CARE
[facility]  Discharge Assessment    Primary Care Provider: Jeansonne, Richard M. Jr., MD     OB Screen completed using health record.  No needs anticipated at this time, and no consult(s) noted.    OB Screen (most recent)       OB Screen - 25 [id number]          OB SCREEN    Assessment Type Discharge Planning Assessment     Source of Information health record     Received Prenatal Care Yes     Any indications/suspicions for None     Is this a teen pregnancy No     Is the baby in NICU No     Indication for adoption/[facility] No     Indication for DME/post-acute needs No     HIV (+) No     Any congenital  disorders No     Fetal demise/ death No

## 2025-06-12 NOTE — PROGRESS NOTES
[facility]  Obstetrics  Labor Progress Note    Patient Name: Kristina Blum  MRN: [id number]  Admission Date: 2025  Hospital Length of Stay: 0 days  Attending Physician: Bienvenido Pradhan MD  Primary Care Provider: Jeansonne, Richard M. Jr., MD    Subjective:     Principal Problem:Encounter for induction of labor    Hospital Course:  34-year-old  3 para 2 at 39 weeks and 1 day gestational age admitted for induction of labor.    Interval History:  Kristina is a 34 y.o.  at 39w1d. She is doing well.  Feeling mild contractions, active fetal movement    Objective:     Vital Signs (Most Recent):  Temp: 97.8 °F (36.6 °C) (25 07)  Pulse: 100 (25)  Resp: 18 (25 07)  BP: 127/86 (25 0701)  SpO2: 96 % (25 0030) Vital Signs (24h Range):  Temp:  [97.8 °F (36.6 °C)] 97.8 °F (36.6 °C)  Pulse:  [] 100  Resp:  [16-18] 18  SpO2:  [96 %] 96 %  BP: (122-143)/(58-87) 127/86     Weight: 109.8 kg (242 lb)  Body mass index is 37.9 kg/m².    FHT:  Baseline 140s with good variability   TOCO:  Q 2-3 minutes    Cervical Exam:  Dilation:  3.5 cm  Effacement:  75%  Station: -3  Presentation: Vertex     Significant Labs:  Lab Results   Component Value Date    GROUPTRH A NEG 2025    HEPBSAG Negative 10/29/2024    STREPBCULT Negative 2025       CBC:   Recent Labs   Lab 25  0025   WBC 9.85   RBC 3.79*   HGB 11.5*   HCT 33.9*      MCV 89   MCH 30.3   MCHC 33.9     I have personallly reviewed all pertinent lab results from the last 24 hours.    Physical Exam  General-no distress resting comfortably in bed   Abdomen/uterus gravid nontender   Extremities-no calf tenderness  Review of Systems  Assessment/Plan:     34 y.o. female  at 39w1d for:    * Encounter for induction of labor  IUP at 39 weeks and 1 day gestational age   Induction of labor-currently on Pitocin   AROM-clear   Epidural when desires   GBS negative   Rh negative  Undesired  fertility-for postpartum tubal ligation          Laura Pradhan MD  [facility]

## 2025-06-12 NOTE — PLAN OF CARE
Problem: Adult Inpatient Plan of Care  Goal: Plan of Care Review  Outcome: Progressing  Flowsheets (Taken 2025 [id number])  Plan of Care Reviewed With:   patient   spouse  Goal: Patient-Specific Goal (Individualized)  Outcome: Progressing  Goal: Absence of Hospital-Acquired Illness or Injury  Outcome: Progressing  Goal: Optimal Comfort and Wellbeing  Outcome: Progressing     Problem:  Fall Injury Risk  Goal: Absence of Fall, Infant Drop and Related Injury  Outcome: Progressing     Problem: Labor  Goal: Hemostasis  Outcome: Met  Goal: Stable Fetal Wellbeing  Outcome: Met  Goal: Effective Progression to Delivery  Outcome: Met  Goal: Absence of Infection Signs and Symptoms  Outcome: Met  Goal: Acceptable Pain Control  Outcome: Met  Goal: Normal Uterine Contraction Pattern  Outcome: Met     Problem: Postpartum (Vaginal Delivery)  Goal: Hemostasis  Outcome: Progressing  Goal: Absence of Infection Signs and Symptoms  Outcome: Progressing  Goal: Anesthesia/Sedation Recovery  Outcome: Progressing  Goal: Optimal Pain Control and Function  Outcome: Progressing  Goal: Effective Urinary Elimination  Outcome: Progressing

## 2025-06-12 NOTE — ASSESSMENT & PLAN NOTE
IUP at 39 weeks and 1 day gestational age   Induction of labor-currently on Pitocin   AROM-clear   Epidural when desires   GBS negative   Rh negative  Undesired fertility-for postpartum tubal ligation

## 2025-06-12 NOTE — NURSING TRANSFER
Nursing Transfer Note      6/12/2025   1:54 PM    Nurse giving handoff:Guerita Arce RN  Nurse receiving handoff:Queta DHALIWAL    Reason patient is being transferred: continued care on post partum    Transfer : to 2103 from LR4     Transfer via wheelchair    Transfer with epidural catheter in place and bello and saline lock in place    Transported by GRADY Arce RN    Transfer Vital Signs:  Blood Pressure:147/82  Heart Rate:94  O2:  Temperature:97.6  Respirations:18    Telemetry:   Order for Tele Monitor? No    Additional Lines: Bello Catheter    Medicines sent: none    Any special needs or follow-up needed: none    Patient belongings transferred with patient: Yes    Chart send with patient: Yes    Notified: spouse    Patient reassessed at: upon arrival to room (date, time)  1  Upon arrival to floor: patient oriented to room, call bell in reach, and bed in lowest position

## 2025-06-12 NOTE — ANESTHESIA PREPROCEDURE EVALUATION
"                                                                                                             06/12/2025  Kristina Blum is a 34 y.o., female.      Pre-op Assessment    I have reviewed the Patient Summary Reports.     I have reviewed the Nursing Notes. I have reviewed the NPO Status.   I have reviewed the Medications.     Review of Systems  Anesthesia Hx:             Denies Family Hx of Anesthesia complications.    Denies Personal Hx of Anesthesia complications.                    Social:  Non-Smoker, No Alcohol Use       Hematology/Oncology:  Hematology Normal   Oncology Normal                                   EENT/Dental:  EENT/Dental Normal           Cardiovascular:  Cardiovascular Normal                                              Pulmonary:    Asthma asymptomatic                   Renal/:  Renal/ Normal                 Hepatic/GI:     GERD                Musculoskeletal:  Musculoskeletal Normal                Neurological:           Patient has herniated lumbar disc (not sure which one), which caused a few episodes of severe low back pain radiating down left leg, which "paralyzed" her left leg for a few days about 2 years ago.  That problem occurred eventually resolved with no treatment.  Patient currently has only occasional low back pain with pregnancy.                            Endocrine:  Endocrine Normal            Psych:  Psychiatric History (ADHD) anxiety                 Physical Exam  General: Well nourished, Cooperative, Alert and Oriented    Airway:  Mallampati: III / II  Mouth Opening: Normal  TM Distance: > 6 cm  Tongue: Normal  Neck ROM: Normal ROM    Dental:  Intact    Chest/Lungs:  Clear to auscultation, Normal Respiratory Rate    Heart:  Rate: Normal  Rhythm: Regular Rhythm  Sounds: Normal        Anesthesia Plan  Type of Anesthesia, risks & benefits discussed:    Anesthesia Type: Epidural  Intra-op Monitoring Plan: Standard ASA Monitors  Informed Consent: Informed consent " signed with the Patient and all parties understand the risks and agree with anesthesia plan.  All questions answered.   ASA Score: 2    Ready For Surgery From Anesthesia Perspective.     .

## 2025-06-13 LAB
ABSOLUTE EOSINOPHIL (SMH): 0.06 K/UL
ABSOLUTE MONOCYTE (SMH): 0.49 K/UL (ref 0.3–1)
ABSOLUTE NEUTROPHIL COUNT (SMH): 6.1 K/UL (ref 1.8–7.7)
BASOPHILS # BLD AUTO: 0.03 K/UL
BASOPHILS NFR BLD AUTO: 0.4 %
ERYTHROCYTE [DISTWIDTH] IN BLOOD BY AUTOMATED COUNT: 14 % (ref 11.5–14.5)
HCT VFR BLD AUTO: 31.9 % (ref 37–48.5)
HGB BLD-MCNC: 10.8 GM/DL (ref 12–16)
IMM GRANULOCYTES # BLD AUTO: 0.07 K/UL (ref 0–0.04)
IMM GRANULOCYTES NFR BLD AUTO: 0.9 % (ref 0–0.5)
LYMPHOCYTES # BLD AUTO: 1.24 K/UL (ref 1–4.8)
MCH RBC QN AUTO: 30.6 PG (ref 27–31)
MCHC RBC AUTO-ENTMCNC: 33.9 G/DL (ref 32–36)
MCV RBC AUTO: 90 FL (ref 82–98)
NUCLEATED RBC (/100WBC) (SMH): 0 /100 WBC
PLATELET # BLD AUTO: 150 K/UL (ref 150–450)
PMV BLD AUTO: 12.3 FL (ref 9.2–12.9)
RBC # BLD AUTO: 3.53 M/UL (ref 4–5.4)
RELATIVE EOSINOPHIL (SMH): 0.7 % (ref 0–8)
RELATIVE LYMPHOCYTE (SMH): 15.5 % (ref 18–48)
RELATIVE MONOCYTE (SMH): 6.1 % (ref 4–15)
RELATIVE NEUTROPHIL (SMH): 76.4 % (ref 38–73)
WBC # BLD AUTO: 8.01 K/UL (ref 3.9–12.7)

## 2025-06-13 PROCEDURE — 25000003 PHARM REV CODE 250: Performed by: SPECIALIST

## 2025-06-13 PROCEDURE — 11000001 HC ACUTE MED/SURG PRIVATE ROOM

## 2025-06-13 PROCEDURE — 36415 COLL VENOUS BLD VENIPUNCTURE: CPT | Performed by: SPECIALIST

## 2025-06-13 PROCEDURE — 85025 COMPLETE CBC W/AUTO DIFF WBC: CPT | Performed by: SPECIALIST

## 2025-06-13 RX ORDER — OXYCODONE AND ACETAMINOPHEN 5; 325 MG/1; MG/1
1 TABLET ORAL EVERY 4 HOURS PRN
Qty: 15 TABLET | Refills: 0 | Status: SHIPPED | OUTPATIENT
Start: 2025-06-13

## 2025-06-13 RX ORDER — IBUPROFEN 800 MG/1
800 TABLET, FILM COATED ORAL EVERY 6 HOURS PRN
Qty: 30 TABLET | Refills: 0 | Status: SHIPPED | OUTPATIENT
Start: 2025-06-13

## 2025-06-13 RX ADMIN — IBUPROFEN 600 MG: 400 TABLET ORAL at 07:06

## 2025-06-13 RX ADMIN — OXYCODONE HYDROCHLORIDE AND ACETAMINOPHEN 1 TABLET: 5; 325 TABLET ORAL at 07:06

## 2025-06-13 RX ADMIN — IBUPROFEN 600 MG: 400 TABLET ORAL at 03:06

## 2025-06-13 RX ADMIN — IBUPROFEN 600 MG: 400 TABLET ORAL at 12:06

## 2025-06-13 RX ADMIN — OXYCODONE HYDROCHLORIDE AND ACETAMINOPHEN 1 TABLET: 10; 325 TABLET ORAL at 12:06

## 2025-06-13 RX ADMIN — ONDANSETRON 8 MG: 4 TABLET, ORALLY DISINTEGRATING ORAL at 09:06

## 2025-06-13 NOTE — DISCHARGE SUMMARY
[facility]  Obstetrics  Discharge Summary      Patient Name: Kristina Blum  MRN: [id number]  Admission Date: 2025  Hospital Length of Stay: 1 days  Discharge Date and Time: 2025 7:16 AM  Attending Physician: Bienvenido Pradhan MD   Discharging Provider: Laura Pradhan MD   Primary Care Provider: Jeansonne, Richard M. Jr., MD    HPI: No notes on file          Hospital Course:   34-year-old  3 para 2 at 39 weeks and 1 day gestational age admitted for induction of labor.  Patient underwent spontaneous vaginal delivery, please see delivery note for details.  By postpartum day 1.  Patient states cramping, requesting pain medication.  Overall bleeding controlled, ambulating urinating without difficulty.  Physical exam on discharge significant for an abdomen which is soft nontender, uterus firm below the umbilicus, lochia minimal, extremities without calf tenderness   Patient desired postpartum tubal however anesthesia unavailable secondary to very busy surgery schedule.  Patient understanding and plan is to schedule outpatient procedure    Discharge will be held if patient desires to stay until tomorrow.    Final Active Diagnoses:    Diagnosis Date Noted POA    PRINCIPAL PROBLEM:  Encounter for induction of labor [Z34.90] 2018 Not Applicable      Problems Resolved During this Admission:        Significant Diagnostic Studies: Labs: CBC   Recent Labs   Lab 25  0025 25  0524   WBC 9.85 8.01   HGB 11.5* 10.8*   HCT 33.9* 31.9*    150     Lab Results   Component Value Date    GROUPTRH A NEG 2025         Feeding Method: both breast and bottle    Immunizations       Date Immunization Status Dose Route/Site Given by    25 1304 MMR Incomplete 0.5 mL Subcutaneous/     25 1304 Tdap Incomplete 0.5 mL Intramuscular/     25 1622 Rho (D) Immune Globulin - IM Given 300 mcg Intramuscular/Right arm Queta Sky RN            Delivery:    Episiotomy: None  "  Lacerations: None   Repair suture: None   Repair # of packets:     Blood loss (ml):       Birth information:  YOB: 2025   Time of birth: 11:55 AM   Sex: female   Delivery type: Vaginal, Spontaneous   Gestational Age: 39w1d     Measurements    Weight: 3380 g  Weight (lbs): 7 lb 7.2 oz  Length: 50.8 cm  Length (in): 20"  Head circumference: 34.9 cm  Chest circumference: 33.7 cm  Abdominal girth: 31.8 cm         Delivery Clinician: Delivery Providers    Delivering clinician: Bienvenido Pradhan MD   Provider Role    Guerita Arce RN Delivery Nurse    Rachell Simental,  Surgical Tech    Susana Raza RN Nurse             Additional  information:  Forceps:    Vacuum:    Breech:    Observed anomalies      Living?:     Apgars    Living status: Living  Apgar Component Scores:  1 min.:  5 min.:  10 min.:  15 min.:  20 min.:    Skin color:  0  1       Heart rate:  2  2       Reflex irritability:  2  2       Muscle tone:  2  2       Respiratory effort:  2  2       Total:  8  9                Placenta: Delivered:       appearance  Pending Diagnostic Studies:       None            Discharged Condition: good    Disposition: Home or Self Care    Follow Up:   Follow-up Information       Bienvenido Pradhan MD Follow up in 6 week(s).    Specialty: Obstetrics and Gynecology  Contact information:  [address] [facility] [address]  [phone]                           Patient Instructions:      Diet Adult Regular     Pelvic Rest     Activity as tolerated     Medications:  Current Discharge Medication List        START taking these medications    Details   ibuprofen (ADVIL,MOTRIN) 800 MG tablet Take 1 tablet (800 mg total) by mouth every 6 (six) hours as needed (cramping).  Qty: 30 tablet, Refills: 0      oxyCODONE-acetaminophen (PERCOCET) 5-325 mg per tablet Take 1 tablet by mouth every 4 (four) hours as needed.  Qty: 15 tablet, Refills: 0    Comments: Quantity prescribed more " than 7 day supply? No  Associated Diagnoses: Encounter for elective induction of labor           STOP taking these medications       dextroamphetamine-amphetamine (ADDERALL) 20 mg tablet Comments:   Reason for Stopping:         drospirenone-ethinyl estradiol (KATE) 3-0.02 mg per tablet Comments:   Reason for Stopping:         ondansetron (ZOFRAN-ODT) 4 MG TbDL Comments:   Reason for Stopping:               Laura Pradhan MD  [facility]

## 2025-06-13 NOTE — ANESTHESIA POSTPROCEDURE EVALUATION
Anesthesia Post Evaluation    Patient: Kristina Blum    Procedure(s) Performed: * No procedures listed *    Final Anesthesia Type: epidural      Patient location during evaluation: floor  Patient participation: Yes- Able to Participate  Level of consciousness: awake and alert  Post-procedure vital signs: reviewed and stable  Pain management: adequate  Airway patency: patent    PONV status at discharge: No PONV  Anesthetic complications: no      Cardiovascular status: stable  Respiratory status: unassisted  Hydration status: euvolemic  Follow-up not needed.  Comments: Both lower extremities back to normal from labor epidural.  Ambulating well. No headache or back pain, just soreness at epidural site. No anesthesia complications.                Vitals Value Taken Time   /89 06/13/25 07:35   Temp 36.4 °C (97.5 °F) 06/13/25 07:35   Pulse 79 06/13/25 07:35   Resp 16 06/13/25 07:35   SpO2 97 % 06/13/25 07:35         No case tracking events are documented in the log.      Pain/Joaquim Score: Pain Rating Prior to Med Admin: 6 (6/13/2025  7:24 AM)  Pain Rating Post Med Admin: 0 (6/13/2025  4:00 AM)

## 2025-06-13 NOTE — PLAN OF CARE
06/13/25 1013   Final Note   Assessment Type Final Discharge Note   Anticipated Discharge Disposition Home   What phone number can be called within the next 1-3 days to see how you are doing after discharge? [phone]   Post-Acute Status   Discharge Delays None known at this time     Discharge orders and chart reviewed with no further post-acute discharge needs identified at this time.  At this time, patient is cleared for discharge from Case Management standpoint.

## 2025-06-14 VITALS
RESPIRATION RATE: 18 BRPM | WEIGHT: 242 LBS | OXYGEN SATURATION: 96 % | HEIGHT: 67 IN | SYSTOLIC BLOOD PRESSURE: 134 MMHG | HEART RATE: 81 BPM | TEMPERATURE: 98 F | DIASTOLIC BLOOD PRESSURE: 89 MMHG | BODY MASS INDEX: 37.98 KG/M2

## 2025-06-14 PROCEDURE — 99238 HOSP IP/OBS DSCHRG MGMT 30/<: CPT | Mod: ,,, | Performed by: GENERAL PRACTICE

## 2025-06-14 PROCEDURE — 25000003 PHARM REV CODE 250: Performed by: SPECIALIST

## 2025-06-14 RX ADMIN — IBUPROFEN 600 MG: 400 TABLET ORAL at 10:06

## 2025-06-14 RX ADMIN — IBUPROFEN 600 MG: 400 TABLET ORAL at 04:06

## 2025-06-14 NOTE — DISCHARGE INSTRUCTIONS
FOLLOW UP WITH YOUR DOCTOR IN 6 WEEKS OR SOONER FOR ANY PROBLEMS.    Pelvic rest for 6 weeks (no sex, tampons, douching, nothing in the vagina)   You can experience vaginal bleeding on and off for up to 6 weeks, it will gradually get lighter and the color will change from bright red to a brownish discharge towards the end.     Activity:   NO strenuous activities or exercising. Do not /lift anything over 15 pounds. Do not do heavy housework or cleaning.   NO driving for 3 days. You may take short car trips but do not drive.   You may shower and/or soak in a bathtub, both are acceptable. Use a mild soap, no heavy perfumes or fragrances to avoid irritation.   If constipation develops: You may take Colace (stool softener), Milk of Magnesia, Dulcolax or Miralax. All of these medications are sold over the counter.     Care of Episiotomy:   Local agents such as Tucks pads & Dermoplast spray. You may also use a Sitz bath: sitting in a tub of warm water for 15 minutes 2-3 times per day will help relieve the discomfort.     Pain Relief:   You may take Motrin for mild pain & uterine cramping.     Emotional Changes:   You may experience baby blues after delivery. You may feel let down, anxious and cry easily. This is normal. These feelings can begin 2-3 days after delivery and usually disappear in about a week or two. Prolonged sadness may indicate postpartum depression.       ***SEEK IMMEDIATE HELP FROM THE EMERGENCY ROOM IF YOU HAVE ANY CHEST PAIN OR DIFFICULTY BREATHING.    Call your doctor for any of the following:   Problems with any of your medications, inability to eat.   Foul smelling vaginal discharge.   Temperature above 100.4.   Heavy vaginal bleeding. All women bleed different after delivery and each delivery is different. Heavy bleeding consists of saturating a rita pad in a 1 hour time period. Passing clots are normal, if you pass a blood clot larger than the size of a golf ball call your doctor's office.    If you experience pain in your legs/calves, if one leg increases in size and becomes swollen or becomes hot to touch or discolored.   Crying or periods of sadness beyond 2 weeks.     If you are breast feeding:   Wash your breasts with mild soap and warm water.   You should wear a supportive bra.   You should continue to take a prenatal vitamin for 6 weeks or until breastfeeding is discontinued.   If nipples are sore, apply a few drops of breast milk after nursing and let air dry or you can use Lanolin cream.   If breasts are engorged, apply warmth and express milk.   [facility] lactation consultant is available at [phone] for your breastfeeding needs.    If you are not breastfeeding:   Wear a tight bra and do not stimulate your breasts. Avoid handling your breasts and do not express milk. You may apply ice packs or cabbage leaves to relieve discomfort from engorgement. If your breasts become warm to touch, reddened or lumps develop call your doctor.

## 2025-06-14 NOTE — DISCHARGE SUMMARY
POSTPARTUM DISCHARGE SUMMARY    HOSPITAL COURSE   Admission Date: 2025   Discharge Date: 2025    Kristina Blum is a 34 y.o.  admitted at 39+1wks for IOL.  Had uncomplicated .  Planned for pp BTL, but surgery schedule did not allow for the procedure.  Postpartum course was uncomplicated and meets discharge criteria on PPD 2.     PREGNANCY & DELIVERY SUMMARY   Primary OB Doctor: Dr. Bienvenido Pradhan    Gestational Age @ Delivery:39w1d  G'sP's:      Antepartum complications:  GBS negative  Prior GDM  Maternal obesity  Rh Negative    Delivery Type: spontaneous vaginal delivery  Date of Delivery: 2025 @ 11:55:00   Delivered By: Dr. Bienvenido Pradhan  Anesthesia: epidural  Intrapartum complications: None  Blood Loss: 217cc  Laceration: none  Placenta: spontaneous    Baby: Liveborn female, Apgars 8/9, weight 3380g / 7lb 7oz  Feeding method: formula    Maternal Blood Type:   Lab Results   Component Value Date    GROUPTRH A NEG 2025    INDIRECTCOOM NEG 2025      Rh Immune globulin given: yes 2025 @ 1622  MMR vaccine given: N/A (Rubella immune)       Subjective   Ambulating without difficulty: yes  Tolerating a normal diet: yes  Voiding normally: yes  GI: passing flatus and already had a BM  Pain management: managing with ibuprofen and prn narcotics  Lochia: lochia and cramping pain were pretty intense yesterday but both are improved this morning  Denies chest pain, SOB, or leg pain.    Objective     Vital Signs (Most Recent):  Temp: 97.7 °F (36.5 °C) (25)  Pulse: 81 (25)  Resp: 18 (25)  BP: 134/89 (25)  SpO2: 96 % (25) Vital Signs (24h Range):  Temp:  [97.5 °F (36.4 °C)-98.1 °F (36.7 °C)] 97.7 °F (36.5 °C)  Pulse:  [81-92] 81  Resp:  [16-18] 18  SpO2:  [96 %-99 %] 96 %  BP: (128-149)/(82-94) 134/89     GEN = nad, alert/oriented  BREASTS = deferred  PULM = normal respiratory efforts  ABD = soft, nontender, nondistended,  "fundus firm at U-2   = deferred  EXT = no CT    Recent Labs   Lab 25  0025 25  0524   WBC 9.85 8.01   HGB 11.5* 10.8*   HCT 33.9* 31.9*    150   MCV 89 90     Assessment and Plan   34 y.o. yo , PPD 2 s/p spontaneous vaginal delivery at 39w1d.  Doing well and ready for discharge home.    routine postpartum care: encouraged ambulation, diet, pain control, and infant/self care  discharge home: today  postpartum care and expectations reviewed; return precautions reviewed    Maegan Velazquez MD    DISCHARGE MEDICATIONS   -- Prenatal Vitamin 1 tab once a day (take for six weeks postpartum or as long as you are breastfeeding)    -- Ibuprofen and Percocet as prescribed by Dr. Pradhan    -- Continue your other outpatient medications (if any) as previously prescribed.    PATIENT INSTRUCTIONS     -call or return for pain not controlled by your medications, heavy bleeding, fever, signs of postpartum depression or "baby blues"    -okay to shower but no bathing, swimming, or soaking for 6 weeks    -pelvic rest (no sex, no tampons, nothing in the vagina) for 6 weeks    -no strenuous activity or lifting >10lbs for 6 weeks    -call the office of Dr. Bienvenido Pradhan to schedule your postpartum appointment(s):     -routine 6 week postpartum visit    I, Maegan Velazquez MD, performed this service on behalf of Dr. Bienvenido Pradhan.    "

## 2025-06-14 NOTE — PLAN OF CARE
OB Screen completed using health record. No needs anticipated at this time, and no consult(s) noted.              25 1405   OB SCREEN   Assessment Type Discharge Planning Assessment   Source of Information health record   Received Prenatal Care Yes   Any indications/suspicions for None   Is this a teen pregnancy No   Is the baby in NICU No   Indication for adoption/Safe Haven No   Indication for DME/post-acute needs No   HIV (+) No   Any congenital  disorders No   Fetal demise/ death No

## 2025-06-14 NOTE — PLAN OF CARE
Patient cleared for discharge from case management standpoint.    Chart and discharge orders reviewed.  Patient discharged with no further case management needs.       06/13/25 1013   Final Note   Assessment Type Final Discharge Note   Anticipated Discharge Disposition Home   What phone number can be called within the next 1-3 days to see how you are doing after discharge? [phone]   Post-Acute Status   Discharge Delays None known at this time

## 2025-06-14 NOTE — PLAN OF CARE
Problem: Adult Inpatient Plan of Care  Goal: Plan of Care Review  Outcome: Met  Goal: Patient-Specific Goal (Individualized)  Outcome: Met  Goal: Absence of Hospital-Acquired Illness or Injury  Outcome: Met  Goal: Optimal Comfort and Wellbeing  Outcome: Met  Goal: Readiness for Transition of Care  Outcome: Met     Problem:  Fall Injury Risk  Goal: Absence of Fall, Infant Drop and Related Injury  Outcome: Met     Problem: Infection  Goal: Absence of Infection Signs and Symptoms  Outcome: Met     Problem: Postpartum (Vaginal Delivery)  Goal: Successful Parent Role Transition  Outcome: Met  Goal: Hemostasis  Outcome: Met  Goal: Absence of Infection Signs and Symptoms  Outcome: Met  Goal: Anesthesia/Sedation Recovery  Outcome: Met  Goal: Optimal Pain Control and Function  Outcome: Met  Goal: Effective Urinary Elimination  Outcome: Met

## 2025-08-20 ENCOUNTER — HOSPITAL ENCOUNTER (OUTPATIENT)
Dept: PREADMISSION TESTING | Facility: HOSPITAL | Age: 35
Discharge: HOME | End: 2025-08-20
Attending: SPECIALIST
Payer: MEDICAID

## 2025-08-20 ENCOUNTER — HOSPITAL ENCOUNTER (OUTPATIENT)
Dept: RADIOLOGY | Facility: HOSPITAL | Age: 35
Discharge: HOME | End: 2025-08-20
Attending: SPECIALIST
Payer: MEDICAID

## 2025-08-20 VITALS — WEIGHT: 239 LBS | HEIGHT: 67 IN | BODY MASS INDEX: 37.51 KG/M2

## 2025-08-20 DIAGNOSIS — Z01.818 PRE-OP TESTING: Primary | ICD-10-CM

## 2025-08-20 DIAGNOSIS — Z01.811 PRE-OP CHEST EXAM: Primary | ICD-10-CM

## 2025-08-20 DIAGNOSIS — Z01.811 PRE-OP CHEST EXAM: ICD-10-CM

## 2025-08-20 DIAGNOSIS — Z30.2 ADMISSION FOR STERILIZATION: ICD-10-CM

## 2025-08-20 RX ORDER — CEFAZOLIN 2 G/1
2 INJECTION, POWDER, FOR SOLUTION INTRAMUSCULAR; INTRAVENOUS ONCE
Status: CANCELLED | OUTPATIENT
Start: 2025-08-22

## 2025-08-22 ENCOUNTER — ANESTHESIA (OUTPATIENT)
Dept: SURGERY | Facility: HOSPITAL | Age: 35
End: 2025-08-22
Payer: MEDICAID

## 2025-08-22 ENCOUNTER — HOSPITAL ENCOUNTER (OUTPATIENT)
Facility: HOSPITAL | Age: 35
Discharge: HOME | End: 2025-08-22
Attending: SPECIALIST | Admitting: SPECIALIST
Payer: MEDICAID

## 2025-08-22 ENCOUNTER — ANESTHESIA EVENT (OUTPATIENT)
Dept: SURGERY | Facility: HOSPITAL | Age: 35
End: 2025-08-22
Payer: MEDICAID

## 2025-08-22 VITALS
DIASTOLIC BLOOD PRESSURE: 67 MMHG | WEIGHT: 239 LBS | HEART RATE: 87 BPM | BODY MASS INDEX: 37.51 KG/M2 | TEMPERATURE: 98 F | OXYGEN SATURATION: 98 % | HEIGHT: 67 IN | RESPIRATION RATE: 18 BRPM | SYSTOLIC BLOOD PRESSURE: 131 MMHG

## 2025-08-22 DIAGNOSIS — Z01.818 PRE-OP TESTING: ICD-10-CM

## 2025-08-22 DIAGNOSIS — Z30.2 ADMISSION FOR STERILIZATION: ICD-10-CM

## 2025-08-22 PROCEDURE — 37000008 HC ANESTHESIA 1ST 15 MINUTES: Performed by: SPECIALIST

## 2025-08-22 PROCEDURE — 36000708 HC OR TIME LEV III 1ST 15 MIN: Performed by: SPECIALIST

## 2025-08-22 PROCEDURE — 71000039 HC RECOVERY, EACH ADD'L HOUR: Performed by: SPECIALIST

## 2025-08-22 PROCEDURE — 25000003 PHARM REV CODE 250: Performed by: SPECIALIST

## 2025-08-22 PROCEDURE — 36000709 HC OR TIME LEV III EA ADD 15 MIN: Performed by: SPECIALIST

## 2025-08-22 PROCEDURE — 37000009 HC ANESTHESIA EA ADD 15 MINS: Performed by: SPECIALIST

## 2025-08-22 PROCEDURE — 71000015 HC POSTOP RECOV 1ST HR: Performed by: SPECIALIST

## 2025-08-22 PROCEDURE — 63600175 PHARM REV CODE 636 W HCPCS: Mod: JZ,TB | Performed by: ANESTHESIOLOGY

## 2025-08-22 PROCEDURE — 63600175 PHARM REV CODE 636 W HCPCS

## 2025-08-22 PROCEDURE — 25000003 PHARM REV CODE 250

## 2025-08-22 PROCEDURE — 63600175 PHARM REV CODE 636 W HCPCS: Performed by: ANESTHESIOLOGY

## 2025-08-22 PROCEDURE — 25000003 PHARM REV CODE 250: Performed by: ANESTHESIOLOGY

## 2025-08-22 PROCEDURE — 27201423 OPTIME MED/SURG SUP & DEVICES STERILE SUPPLY: Performed by: SPECIALIST

## 2025-08-22 PROCEDURE — 71000033 HC RECOVERY, INTIAL HOUR: Performed by: SPECIALIST

## 2025-08-22 PROCEDURE — 63600175 PHARM REV CODE 636 W HCPCS: Performed by: SPECIALIST

## 2025-08-22 RX ORDER — DIPHENHYDRAMINE HYDROCHLORIDE 50 MG/ML
INJECTION, SOLUTION INTRAMUSCULAR; INTRAVENOUS
Status: DISCONTINUED | OUTPATIENT
Start: 2025-08-22 | End: 2025-08-22

## 2025-08-22 RX ORDER — OXYCODONE HYDROCHLORIDE 5 MG/1
5 TABLET ORAL
Refills: 0 | Status: DISCONTINUED | OUTPATIENT
Start: 2025-08-22 | End: 2025-08-22 | Stop reason: HOSPADM

## 2025-08-22 RX ORDER — DEXAMETHASONE SODIUM PHOSPHATE 4 MG/ML
INJECTION, SOLUTION INTRA-ARTICULAR; INTRALESIONAL; INTRAMUSCULAR; INTRAVENOUS; SOFT TISSUE
Status: DISCONTINUED | OUTPATIENT
Start: 2025-08-22 | End: 2025-08-22

## 2025-08-22 RX ORDER — MIDAZOLAM HYDROCHLORIDE 1 MG/ML
INJECTION INTRAMUSCULAR; INTRAVENOUS
Status: DISCONTINUED | OUTPATIENT
Start: 2025-08-22 | End: 2025-08-22

## 2025-08-22 RX ORDER — DIPHENHYDRAMINE HYDROCHLORIDE 50 MG/ML
12.5 INJECTION, SOLUTION INTRAMUSCULAR; INTRAVENOUS
Status: DISCONTINUED | OUTPATIENT
Start: 2025-08-22 | End: 2025-08-22 | Stop reason: HOSPADM

## 2025-08-22 RX ORDER — FENTANYL CITRATE 50 UG/ML
INJECTION, SOLUTION INTRAMUSCULAR; INTRAVENOUS
Status: DISCONTINUED | OUTPATIENT
Start: 2025-08-22 | End: 2025-08-22

## 2025-08-22 RX ORDER — KETOROLAC TROMETHAMINE 30 MG/ML
30 INJECTION, SOLUTION INTRAMUSCULAR; INTRAVENOUS ONCE
Status: COMPLETED | OUTPATIENT
Start: 2025-08-22 | End: 2025-08-22

## 2025-08-22 RX ORDER — GLUCAGON 1 MG
1 KIT INJECTION
Status: DISCONTINUED | OUTPATIENT
Start: 2025-08-22 | End: 2025-08-22 | Stop reason: HOSPADM

## 2025-08-22 RX ORDER — HYDROMORPHONE HYDROCHLORIDE 1 MG/ML
0.2 INJECTION, SOLUTION INTRAMUSCULAR; INTRAVENOUS; SUBCUTANEOUS EVERY 5 MIN PRN
Refills: 0 | Status: DISCONTINUED | OUTPATIENT
Start: 2025-08-22 | End: 2025-08-22 | Stop reason: HOSPADM

## 2025-08-22 RX ORDER — PROPOFOL 10 MG/ML
VIAL (ML) INTRAVENOUS
Status: DISCONTINUED | OUTPATIENT
Start: 2025-08-22 | End: 2025-08-22

## 2025-08-22 RX ORDER — BUPIVACAINE HYDROCHLORIDE AND EPINEPHRINE 2.5; 5 MG/ML; UG/ML
INJECTION, SOLUTION EPIDURAL; INFILTRATION; INTRACAUDAL; PERINEURAL
Status: DISCONTINUED | OUTPATIENT
Start: 2025-08-22 | End: 2025-08-22 | Stop reason: HOSPADM

## 2025-08-22 RX ORDER — ONDANSETRON HYDROCHLORIDE 2 MG/ML
4 INJECTION, SOLUTION INTRAVENOUS DAILY PRN
Status: DISCONTINUED | OUTPATIENT
Start: 2025-08-22 | End: 2025-08-22 | Stop reason: HOSPADM

## 2025-08-22 RX ORDER — ROCURONIUM BROMIDE 10 MG/ML
INJECTION, SOLUTION INTRAVENOUS
Status: DISCONTINUED | OUTPATIENT
Start: 2025-08-22 | End: 2025-08-22

## 2025-08-22 RX ORDER — MEPERIDINE HYDROCHLORIDE 50 MG/ML
12.5 INJECTION INTRAMUSCULAR; INTRAVENOUS; SUBCUTANEOUS EVERY 10 MIN PRN
Refills: 0 | Status: DISCONTINUED | OUTPATIENT
Start: 2025-08-22 | End: 2025-08-22 | Stop reason: HOSPADM

## 2025-08-22 RX ORDER — FAMOTIDINE 10 MG/ML
INJECTION, SOLUTION INTRAVENOUS
Status: DISCONTINUED | OUTPATIENT
Start: 2025-08-22 | End: 2025-08-22

## 2025-08-22 RX ORDER — ONDANSETRON 4 MG/1
4 TABLET, ORALLY DISINTEGRATING ORAL ONCE
Status: DISCONTINUED | OUTPATIENT
Start: 2025-08-22 | End: 2025-08-22 | Stop reason: HOSPADM

## 2025-08-22 RX ORDER — ACETAMINOPHEN 10 MG/ML
INJECTION, SOLUTION INTRAVENOUS
Status: DISCONTINUED | OUTPATIENT
Start: 2025-08-22 | End: 2025-08-22

## 2025-08-22 RX ORDER — CEFAZOLIN 2 G/1
2 INJECTION, POWDER, FOR SOLUTION INTRAMUSCULAR; INTRAVENOUS ONCE
Status: COMPLETED | OUTPATIENT
Start: 2025-08-22 | End: 2025-08-22

## 2025-08-22 RX ORDER — SCOPOLAMINE 1 MG/3D
1 PATCH, EXTENDED RELEASE TRANSDERMAL ONCE
Status: DISCONTINUED | OUTPATIENT
Start: 2025-08-22 | End: 2025-08-22 | Stop reason: HOSPADM

## 2025-08-22 RX ORDER — LIDOCAINE HYDROCHLORIDE 20 MG/ML
INJECTION, SOLUTION EPIDURAL; INFILTRATION; INTRACAUDAL; PERINEURAL
Status: DISCONTINUED | OUTPATIENT
Start: 2025-08-22 | End: 2025-08-22

## 2025-08-22 RX ORDER — OXYCODONE HYDROCHLORIDE 5 MG/1
5 TABLET ORAL EVERY 4 HOURS PRN
Refills: 0 | Status: DISCONTINUED | OUTPATIENT
Start: 2025-08-22 | End: 2025-08-22 | Stop reason: HOSPADM

## 2025-08-22 RX ORDER — ONDANSETRON HYDROCHLORIDE 2 MG/ML
INJECTION, SOLUTION INTRAVENOUS
Status: DISCONTINUED | OUTPATIENT
Start: 2025-08-22 | End: 2025-08-22

## 2025-08-22 RX ORDER — SUCCINYLCHOLINE CHLORIDE 20 MG/ML
INJECTION INTRAMUSCULAR; INTRAVENOUS
Status: DISCONTINUED | OUTPATIENT
Start: 2025-08-22 | End: 2025-08-22

## 2025-08-22 RX ADMIN — CEFAZOLIN 2 G: 2 INJECTION, POWDER, FOR SOLUTION INTRAMUSCULAR; INTRAVENOUS at 07:08

## 2025-08-22 RX ADMIN — FENTANYL CITRATE 50 MCG: 50 INJECTION, SOLUTION INTRAMUSCULAR; INTRAVENOUS at 06:08

## 2025-08-22 RX ADMIN — ROCURONIUM BROMIDE 50 MG: 10 INJECTION, SOLUTION INTRAVENOUS at 06:08

## 2025-08-22 RX ADMIN — SUGAMMADEX 200 MG: 100 INJECTION, SOLUTION INTRAVENOUS at 07:08

## 2025-08-22 RX ADMIN — FAMOTIDINE 20 MG: 10 INJECTION, SOLUTION INTRAVENOUS at 07:08

## 2025-08-22 RX ADMIN — OXYCODONE HYDROCHLORIDE 5 MG: 5 TABLET ORAL at 08:08

## 2025-08-22 RX ADMIN — HYDROMORPHONE HYDROCHLORIDE 0.2 MG: 1 INJECTION, SOLUTION INTRAMUSCULAR; INTRAVENOUS; SUBCUTANEOUS at 08:08

## 2025-08-22 RX ADMIN — DEXAMETHASONE SODIUM PHOSPHATE 8 MG: 4 INJECTION, SOLUTION INTRAMUSCULAR; INTRAVENOUS at 07:08

## 2025-08-22 RX ADMIN — ACETAMINOPHEN 1000 MG: 10 INJECTION, SOLUTION INTRAVENOUS at 07:08

## 2025-08-22 RX ADMIN — Medication 140 MG: at 06:08

## 2025-08-22 RX ADMIN — LIDOCAINE HYDROCHLORIDE 100 MG: 20 INJECTION, SOLUTION EPIDURAL; INFILTRATION; INTRACAUDAL; PERINEURAL at 06:08

## 2025-08-22 RX ADMIN — SODIUM CHLORIDE, SODIUM LACTATE, POTASSIUM CHLORIDE, AND CALCIUM CHLORIDE: .6; .31; .03; .02 INJECTION, SOLUTION INTRAVENOUS at 06:08

## 2025-08-22 RX ADMIN — ONDANSETRON 4 MG: 2 INJECTION INTRAMUSCULAR; INTRAVENOUS at 07:08

## 2025-08-22 RX ADMIN — KETOROLAC TROMETHAMINE 30 MG: 30 INJECTION, SOLUTION INTRAMUSCULAR at 09:08

## 2025-08-22 RX ADMIN — PROPOFOL 200 MG: 10 INJECTION, EMULSION INTRAVENOUS at 06:08

## 2025-08-22 RX ADMIN — MIDAZOLAM HYDROCHLORIDE 2 MG: 1 INJECTION, SOLUTION INTRAMUSCULAR; INTRAVENOUS at 06:08

## 2025-08-22 RX ADMIN — SCOPOLAMINE 1 PATCH: 1.5 PATCH, EXTENDED RELEASE TRANSDERMAL at 06:08

## 2025-08-22 RX ADMIN — DIPHENHYDRAMINE HYDROCHLORIDE 6.25 MG: 50 INJECTION INTRAMUSCULAR; INTRAVENOUS at 06:08

## (undated) DEVICE — PAD ANTI-SKID OR 44X23X2IN

## (undated) DEVICE — APPLIER CLIP  5MM

## (undated) DEVICE — WARMER SCOPE SEE SHARP

## (undated) DEVICE — NDL PNEUMOPERITONEUM 150MM

## (undated) DEVICE — TROCAR OPTICAL ZTHREAD 12MMX100MM CTF73

## (undated) DEVICE — TROCAR ENDOPATH XCEL BLADELESS B5LT

## (undated) DEVICE — SOL POVIDONE PREP IODINE 4 OZ

## (undated) DEVICE — TRAY SKIN SCRUB DRY PREMIUM

## (undated) DEVICE — TROCAR 5X100MM BLADED Z THREAD

## (undated) DEVICE — Device

## (undated) DEVICE — UTERINE MANIPULATOR HUMI 6003

## (undated) DEVICE — SPONGE COTTON TRAY 4X4IN

## (undated) DEVICE — SOL NACL IRR 1000ML BTL

## (undated) DEVICE — ELECTRODE OLSEN HOOK L-SHAPED 13INX330MM

## (undated) DEVICE — TROCAR SURG BLD NONTHRD 11X100

## (undated) DEVICE — ELECTRODE MEGADYNE RETURN DUAL

## (undated) DEVICE — NEEDLE INSUFFLATION 150MM PN150

## (undated) DEVICE — GOWN ORBIS LVL 4 XXL 49IN

## (undated) DEVICE — PACK LAPAROSCOPY 44X90IN

## (undated) DEVICE — SLEEVE TROCAR ENDOPATH XCEL

## (undated) DEVICE — PAD SANITARY MAXI 11IN

## (undated) DEVICE — GLOVE BIOGEL PI  GOLD SZ 7

## (undated) DEVICE — SYRINGE HYPODERMC LL 22G 1.5 ECLIPSE 30

## (undated) DEVICE — SUTURE VICRYL #0 27 UR-6 VCP603H

## (undated) DEVICE — R SEALER LIGASURE LAP 37CM 5MM

## (undated) DEVICE — GLOVE SENSICARE PI SLT 8.5

## (undated) DEVICE — SCISSORS 5MM APPLIED MEDICAL   CB030

## (undated) DEVICE — PAD BOVIE ADULT

## (undated) DEVICE — SUTURE MONOCRYL 4-0 PS-2 27 MCP426H

## (undated) DEVICE — ADHESIVE TISSUE EXOFIN

## (undated) DEVICE — SOL POVIDONE SCRUB IODINE 4 OZ

## (undated) DEVICE — SET TUBE PNEUMOCLEAR SE HI FLO

## (undated) DEVICE — DISSECTOR KITTNER 13300

## (undated) DEVICE — SUT MONOCRYL 4-0 PS-2

## (undated) DEVICE — TRAY GENERAL LAPAROSCOPY

## (undated) DEVICE — CATH URETHRAL RED 16FR

## (undated) DEVICE — SOLUTION IRRI NS BOTTLE 1000ML R5200-01

## (undated) DEVICE — POUCH RETRIEVAL 10MM APP. MED.     CD001

## (undated) DEVICE — CABLE MONOPOLAR 10FT DISPOSABLE